# Patient Record
Sex: FEMALE | Race: WHITE | Employment: FULL TIME | URBAN - METROPOLITAN AREA
[De-identification: names, ages, dates, MRNs, and addresses within clinical notes are randomized per-mention and may not be internally consistent; named-entity substitution may affect disease eponyms.]

---

## 2022-07-26 ENCOUNTER — HOSPITAL ENCOUNTER (OUTPATIENT)
Dept: PHYSICAL THERAPY | Age: 57
Discharge: HOME | End: 2022-07-26
Payer: COMMERCIAL

## 2022-07-26 PROCEDURE — 97162 PT EVAL MOD COMPLEX 30 MIN: CPT

## 2022-07-26 PROCEDURE — 97140 MANUAL THERAPY 1/> REGIONS: CPT

## 2022-07-26 PROCEDURE — 97110 THERAPEUTIC EXERCISES: CPT

## 2022-07-26 NOTE — PROGRESS NOTES
[address] PHYS[facility] THERAPY AT [facility]  [address] [facility] [address] [address],[address], [place] levi, Gigi Winters  Phone [phone]  Fax [phone] / [address] [address] PHYSICAL THERAPY SERVICES  Patient Name: Jennie Reed : 1965   Medical   Diagnosis: Left ankle pain [M25.572] Treatment Diagnosis: Left ankle pain   Onset Date:      Referral Source: Blayne Bain MD Start of Care [facility]): 2022   Prior Hospitalization: See medical history Provider #: [id number]   Prior Level of Function: IND, enjoyed walking and riding her bike, 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   =================================================================================  Assessment / key information:    Subjective functional status/changes: The patient reports left ankle pain that believes started around  as she gradually started gaining weight. She states she her ankle pain was gradually increases and was diagnosed with arthritis and posterior tibial tendonitis based off of x-rays per patient report. The patient reports exacerbation of stiffness, pain and imbalance around early 2022. She reports occasional numbness and tingling to her left foot. Her symptoms are increased with stair ambulation, when she wakes up in the morning, prolonged walking. Her pain is was unchanged with ibuprofen, declined steroids because of previous medical hx. She would like to return to walking and riding her bike. Pain at worst: 7-8/10. FOTO: 43/100. Other Objective/Functional Measures:        Fall Risk Assessment: Does the patient have a fear of falling? YES If yes, what fall risk assessment was performed?   5xSTS: 20 seconds without UE support, pain increase to 3/10, decreased WB through LLE  SLS: right 13 seconds, left unable   Gait: decreased weight acceptance on the left and limited left heel strike      Heel raise height R>L and pain increase on L     Knee strength: 5/5 globally B/L         LEFT Ankle strength: DF 5/5, PF 5/5, EVR 4+/5, INV 4-/5 with pain; posterior tibialis 4-/5 with pain upon resistance      LEFT Ankle AROM before manual: DF -15 deg P!, EVR 5 deg p!, INV 35 deg       LEFT Ankle AROM after manual: DF +5 deg, EVR 15 deg     Palpation: TTP at distal posterior tib       Pain Level (0-10 scale) post treatment: 0/10     ASSESSMENT/Changes in Function:   Upon evaluation, the patient presents with c/o left ankle pain. She presents today with limitations in left ankle strength. Left ankle AROM limited and painful, improved following manual treatment. TTP along distal posterior tib with limited strength and pain upon resistance. She presents with decreased functional mobility and balance indicated by 5xSTS test and SLS tolerance. Developed and provided HEP.   The patient will benefit from skilled PT to address above limitations and improve QoL.  =================================================================================  Eval Complexity: History: MEDIUM  Complexity : 1-2 comorbidities / personal factors will impact the outcome/ POC Exam:MEDIUM Complexity : 3 Standardized tests and measures addressing body structure, function, activity limitation and / or participation in recreation  Presentation: MEDIUM Complexity : Evolving with changing characteristics  Clinical Decision Making:MEDIUM Complexity : FOTO score of 26-74Overall Complexity:MEDIUM  Problem List: pain affecting function, decrease ROM, decrease strength, edema affecting function, impaired gait/ balance, decrease ADL/ functional abilitiies, decrease activity tolerance, and decrease flexibility/ joint mobility   Treatment Plan may include any combination of the following: Therapeutic exercise, Therapeutic activities, Neuromuscular re-education, Physical agent/modality, Gait/balance training, Manual therapy, and Patient education  Patient / Family readiness to learn indicated by: asking questions  Persons(s) to be included in education: patient (P)  Barriers to Learning/Limitations: None  Measures taken:    Patient Goal (s): Better control of the left ankle   Patient self reported health status: good  Rehabilitation Potential: good      Short term goals to be accomplished in 4 visits: The pt will be IND and compliant with HEP and self management of symptoms. The patient will demonstrate good heel strike on the left to improve her walking tolerance. Long term goals to be accomplished in 12 visits: The patient will improve left ankle INV and EVR strength to 5/5 to improve her walking tolerance. The patient will improve left ankle DF AROM to 10 deg and EVR AROM to 20 deg to improve her stair ambulation tolerance. The patient will perform 5xSTS test in 18 seconds as an indicator of improved functional mobility. The patient will tolerance SLS on the left for 5 seconds to improve her gait stability. The patient will improve FOTO score to 64/100 as a functional indicator of improved mobility. Frequency / Duration:   Patient to be seen  2  times per week for 12  treatments: (All LTG as above will be assessed and updated every 10 visits or 30 days and progressed as needed)    Patient / Caregiver education and instruction: exercises  Therapist Signature: Shoshana Lefort, PT Date: 5/72/4905   Certification Period: N/A Time: 7:27 PM   ===========================================================================================  I certify that the above Physical Therapy Services are being furnished while the patient is under my care. I agree with the treatment plan and certify that this therapy is necessary.     Physician Signature:        Date:       Time:     Please sign and return to [facility] or you may fax the signed copy to [phone]. Thank you.   Dimitrios Neri MD

## 2022-07-26 NOTE — PROGRESS NOTES
PT DAILY TREATMENT NOTE     Patient Name: Arturo Reed  Date:2022  : 1965  [x]  Patient  Verified  Payor: BLUE CROSS / Plan: [facility] PPO / Product Type: PPO /    In time:4:18  Out time:5:03  Total Treatment Time (min): 45  Visit #: 1 of 12    Medicare/BCBS Only   Total Timed Codes (min):  25 1:1 Treatment Time:  45       Treatment Area: Left ankle pain [M25.572]    SUBJECTIVE  Pain Level (0-10 scale): 2/10  Any medication changes, allergies to medications, adverse drug reactions, diagnosis change, or new procedure performed?: [x] No    [] Yes (see summary sheet for update)  Subjective functional status/changes:   [] No changes reported  The patient reports left ankle pain that believes started around  as she gradually started gaining weight. She states she her ankle pain was gradually increases and was diagnosed with arthritis and posterior tibial tendonitis based off of x-rays per patient report. The patient reports exacerbation of stiffness, pain and imbalance around early 2022. She reports occasional numbness and tingling to her left foot. Her symptoms are increased with stair ambulation, when she wakes up in the morning, prolonged walking. Her pain is was unchanged with ibuprofen, declined steroids because of previous medical hx. She would like to return to walking and riding her bike. Pain at worst: 7-8/10. FOTO: 43/100.      OBJECTIVE    Modality rationale: decrease edema, decrease inflammation, and decrease pain to improve the patients walking tolerance    Min Type Additional Details    [] Estim:  []Unatt       []IFC  []Premod                        []Other:  []w/ice   []w/heat  Position:  Location:    [] Estim: []Att    []TENS instruct  []NMES                    []Other:  []w/US   []w/ice   []w/heat  Position:  Location:    []  Traction: [] Cervical       []Lumbar                       [] Prone          []Supine                       []Intermittent []Continuous Lbs:  [] before manual  [] after manual    []  Ultrasound: []Continuous   [] Pulsed                           []1MHz   []3MHz W/cm2:  Location:    []  Iontophoresis with dexamethasone         Location: [] Take home patch   [] In clinic    []  Ice     []  heat  []  Ice massage  []  Laser   []  Anodyne Position:  Location:    []  Laser with stim  []  Other:  Position:  Location:    []  Vasopneumatic Device  Pre-treatment girth:   Post-treatment girth:   Measured at landmark location:  Pressure:       [] lo [] med [] hi   Temperature: [] lo [] med [] hi   [] Skin assessment post-treatment:  []intact []redness- no adverse reaction    []redness - adverse reaction:   Vasopnuematic compression justification:  Per bilateral girth measures taken and listed above the edema is considered significant and having an impact on the patient's strength, balance, and gait    20 min [x]Eval                  []Re-Eval       15 min Therapeutic Exercise:  [] See flow sheet :  HEP development and review   Seated soleus stretch  Toe yoga   Rationale: increase ROM and increase strength to improve the patients walking tolerance    x min Therapeutic Activity:  []  See flow sheet :   Rationale: increase ROM and increase strength  to improve the patients ability to ambulate stairs      x min Neuromuscular Re-education:  []  See flow sheet :   Rationale: increase ROM, increase strength, improve balance, and increase proprioception  to improve the patients gait stability    10 min Manual Therapy: In supine, posterior talar mobs, left ankle PROM, calcaneal mobs and distraction   The manual therapy interventions were performed at a separate and distinct time from the therapeutic activities interventions.   Rationale: decrease pain, increase ROM, and decrease edema  to improve her walking tolerance           With   [] TE   [] TA   [] neuro   [] other: Patient Education: [x] Review HEP    [] Progressed/Changed HEP based on:   [] positioning   [] body mechanics   [] transfers   [] heat/ice application    [] other:      Other Objective/Functional Measures:    Fall Risk Assessment: Does the patient have a fear of falling? YES If yes, what fall risk assessment was performed? 5xSTS: 20 seconds without UE support, pain increase to 3/10, decreased WB through LLE   SLS: right 13 seconds, left unable   Gait: decreased weight acceptance on the left and limited left heel strike     Heel raise height R>L and pain increase on L     Knee strength: 5/5 globally B/L        LEFT Ankle strength: DF 5/5, PF 5/5, EVR 4+/5, INV 4-/5 with pain; posterior tibialis 4-/5 with pain upon resistance     LEFT Ankle AROM before manual: DF -15 deg P!, EVR 5 deg p!, INV 35 deg      LEFT Ankle AROM after manual: DF +5 deg, EVR 15 deg    Palpation: TTP at distal posterior tib      Pain Level (0-10 scale) post treatment: 0/10    ASSESSMENT/Changes in Function:   Upon evaluation, the patient presents with c/o left ankle pain. She presents today with limitations in left ankle strength. Left ankle AROM limited and painful, improved following manual treatment. TTP along distal posterior tib with limited strength and pain upon resistance. She presents with decreased functional mobility and balance indicated by 5xSTS test and SLS tolerance. Developed and provided HEP. The patient will benefit from skilled PT to address above limitations and improve QoL. Patient will continue to benefit from skilled PT services to modify and progress therapeutic interventions, address functional mobility deficits, address ROM deficits, address strength deficits, analyze and address soft tissue restrictions, analyze and cue movement patterns, and analyze and modify body mechanics/ergonomics to attain remaining goals.      [x]  See Plan of Care  []  See progress note/recertification  []  See Discharge Summary         Progress towards goals / Updated goals:  Short term goals to be accomplished in 4 visits: The pt will be IND and compliant with HEP and self management of symptoms. The patient will demonstrate good heel strike on the left to improve her walking tolerance. Long term goals to be accomplished in 12 visits: The patient will improve left ankle INV and EVR strength to 5/5 to improve her walking tolerance. The patient will improve left ankle DF AROM to 10 deg and EVR AROM to 20 deg to improve her stair ambulation tolerance. The patient will perform 5xSTS test in 18 seconds as an indicator of improved functional mobility. The patient will tolerance SLS on the left for 5 seconds to improve her gait stability. The patient will improve FOTO score to 64/100 as a functional indicator of improved mobility. PLAN  []  Upgrade activities as tolerated     []  Continue plan of care  []  Update interventions per flow sheet       []  Discharge due to:_  [x]  Other: begin skilled PT 2x/week for 12 visits       Justification for Eval Code Complexity:  Patient History : hx of ankle pain starting 2016  Examination see exam   Clinical Presentation: evolving with changing characteristics   Clinical Decision Making : FOTO : 37 /100     Del Lew, PT 7/26/2022  4:19 PM    No future appointments.

## 2022-07-28 ENCOUNTER — HOSPITAL ENCOUNTER (OUTPATIENT)
Dept: PHYSICAL THERAPY | Age: 57
Discharge: HOME | End: 2022-07-28
Payer: COMMERCIAL

## 2022-07-28 PROCEDURE — 97140 MANUAL THERAPY 1/> REGIONS: CPT

## 2022-07-28 PROCEDURE — 97110 THERAPEUTIC EXERCISES: CPT

## 2022-07-28 NOTE — PROGRESS NOTES
PT DAILY TREATMENT NOTE     Patient Name: Manju Sharp Old  Date:2022  : 1965  [x]  Patient  Verified  Payor: BLUE CROSS / Plan: [facility] PPO / Product Type: PPO /    In time:4:20 Out time: 5:05   Total Treatment Time (min): 45   Visit #: 2 of 12    Medicare/BCBS Only   Total Timed Codes (min):  45  1:1 Treatment Time:  45        Treatment Area: Left ankle pain [M25.572]    SUBJECTIVE  Pain Level (0-10 scale): 1/10  Any medication changes, allergies to medications, adverse drug reactions, diagnosis change, or new procedure performed?: [x] No    [] Yes (see summary sheet for update)  Subjective functional status/changes:   [] No changes reported  The patient states sneakers hurt her ankle so she is wearing sandals. No questions regarding HEP.      OBJECTIVE    Modality rationale: decrease edema, decrease inflammation, and decrease pain to improve the patients walking tolerance    Min Type Additional Details    [] Estim:  []Unatt       []IFC  []Premod                        []Other:  []w/ice   []w/heat  Position:  Location:    [] Estim: []Att    []TENS instruct  []NMES                    []Other:  []w/US   []w/ice   []w/heat  Position:  Location:    []  Traction: [] Cervical       []Lumbar                       [] Prone          []Supine                       []Intermittent   []Continuous Lbs:  [] before manual  [] after manual    []  Ultrasound: []Continuous   [] Pulsed                           []1MHz   []3MHz W/cm2:  Location:    []  Iontophoresis with dexamethasone         Location: [] Take home patch   [] In clinic    []  Ice     []  heat  []  Ice massage  []  Laser   []  Anodyne Position:  Location:    []  Laser with stim  []  Other:  Position:  Location:    []  Vasopneumatic Device  Pre-treatment girth:   Post-treatment girth:   Measured at landmark location:  Pressure:       [] lo [] med [] hi   Temperature: [] lo [] med [] hi   [] Skin assessment post-treatment:  []intact []redness- no adverse reaction    []redness - adverse reaction:   Vasopnuematic compression justification:  Per bilateral girth measures taken and listed above the edema is considered significant and having an impact on the patient's strength, balance, and gait    25 min Therapeutic Exercise:  [] See flow sheet :  Bike   Soleus stretch standing - pain   Marbles  Toe yoga  Short foot  Towel scrunch   Patient education: anatomy of anterior tib, posterior tib; ice application     Rationale: increase ROM and increase strength to improve the patients walking tolerance    10  min Therapeutic Activity:  []  See flow sheet :  HR/TR standing  Mini squats -- held pain    Rationale: increase ROM and increase strength  to improve the patients ability to ambulate stairs      x min Neuromuscular Re-education:  []  See flow sheet :   Rationale: increase ROM, increase strength, improve balance, and increase proprioception  to improve the patients gait stability    10  min Manual Therapy: In seated EOB, posterior talar mobs, left ankle PROM, calcaneal mobs and distraction; STM to ant tib    The manual therapy interventions were performed at a separate and distinct time from the therapeutic activities interventions. Rationale: decrease pain, increase ROM, and decrease edema  to improve her walking tolerance           With   [] TE   [] TA   [] neuro   [] other: Patient Education: [x] Review HEP    [] Progressed/Changed HEP based on:   [] positioning   [] body mechanics   [] transfers   [] heat/ice application    [] other:      Other Objective/Functional Measures:    Initiated per POC   Increased left ankle pain reported with standing soleus stretch  Increased left knee pain with mini squats     Pain Level (0-10 scale) post treatment: 1 /10    ASSESSMENT/Changes in Function:   Poor tolerance to standing exercises as the patient reported increased pain at left ankle and left knee.  Increased rest breaks during standing exercises secondary to pain. Good tolerance to seated foot/ankle exercises. Noted increased tightness along left ant tib. Progress as tolerated nv. Patient will continue to benefit from skilled PT services to modify and progress therapeutic interventions, address functional mobility deficits, address ROM deficits, address strength deficits, analyze and address soft tissue restrictions, analyze and cue movement patterns, and analyze and modify body mechanics/ergonomics to attain remaining goals. [x]  See Plan of Care  []  See progress note/recertification  []  See Discharge Summary         Progress towards goals / Updated goals:  Short term goals to be accomplished in 4 visits: The pt will be IND and compliant with HEP and self management of symptoms. The patient will demonstrate good heel strike on the left to improve her walking tolerance. Long term goals to be accomplished in 12 visits: The patient will improve left ankle INV and EVR strength to 5/5 to improve her walking tolerance. The patient will improve left ankle DF AROM to 10 deg and EVR AROM to 20 deg to improve her stair ambulation tolerance. The patient will perform 5xSTS test in 18 seconds as an indicator of improved functional mobility. The patient will tolerance SLS on the left for 5 seconds to improve her gait stability. The patient will improve FOTO score to 64/100 as a functional indicator of improved mobility.       PLAN  []  Upgrade activities as tolerated     []  Continue plan of care  []  Update interventions per flow sheet       []  Discharge due to:_  [x]  Other: progress as tolerated Choco Donovan, PT 7/28/2022  4:19 PM    Future Appointments   Date Time Provider Lupillo James   8/2/2022  4:15 PM Craigheadchau Malik, PT [facility]   8/4/2022  3:30 PM Apoloniachau Malik, PT [facility]   8/9/2022  4:15 PM Mónica Arroyo [facility]   8/11/2022  4:15 PM Craigheadchau Malik, PT [facility]   8/16/2022  2:45 PM Apoloniachau Malik, PT [facility]   8/18/2022  4:15 PM Marlana Schwartz [facility]   8/23/2022  3:30 PM Marlana Schwartz [facility]   8/25/2022  2:45 PM Humaira Piña, PT [facility]   8/30/2022  4:15 PM Humaira Piña, PT [facility]   9/1/2022  4:15 PM Humaira Piña, PT [facility]   9/6/2022  4:15 PM Humaira Piña, PT [facility]   9/8/2022  4:15 PM Humaira Piña, PT [facility]

## 2022-08-02 ENCOUNTER — HOSPITAL ENCOUNTER (OUTPATIENT)
Dept: PHYSICAL THERAPY | Age: 57
Discharge: HOME | End: 2022-08-02
Payer: COMMERCIAL

## 2022-08-02 PROCEDURE — 97140 MANUAL THERAPY 1/> REGIONS: CPT

## 2022-08-02 PROCEDURE — 97110 THERAPEUTIC EXERCISES: CPT

## 2022-08-02 PROCEDURE — 97530 THERAPEUTIC ACTIVITIES: CPT

## 2022-08-02 PROCEDURE — 97016 VASOPNEUMATIC DEVICE THERAPY: CPT

## 2022-08-02 NOTE — PROGRESS NOTES
PT DAILY TREATMENT NOTE     Patient Name: Shira Crystal  Date:2022  : 1965  [x]  Patient  Verified  Payor: BLUE CROSS / Plan: [facility] PPO / Product Type: PPO /    In time:4:20 Out time: 5:14   Total Treatment Time (min): 54    Visit #: 3 of 12    Medicare/BCBS Only   Total Timed Codes (min):  44   1:1 Treatment Time:  41        Treatment Area: Left ankle pain [M25.572]    SUBJECTIVE  Pain Level (0-10 scale): 1/10  Any medication changes, allergies to medications, adverse drug reactions, diagnosis change, or new procedure performed?: [x] No    [] Yes (see summary sheet for update)  Subjective functional status/changes:   [] No changes reported  The patient reports she is feeling good, good compliance with HEP.      OBJECTIVE    Modality rationale: decrease edema, decrease inflammation, and decrease pain to improve the patients walking tolerance    Min Type Additional Details    [] Estim:  []Unatt       []IFC  []Premod                        []Other:  []w/ice   []w/heat  Position:  Location:    [] Estim: []Att    []TENS instruct  []NMES                    []Other:  []w/US   []w/ice   []w/heat  Position:  Location:    []  Traction: [] Cervical       []Lumbar                       [] Prone          []Supine                       []Intermittent   []Continuous Lbs:  [] before manual  [] after manual    []  Ultrasound: []Continuous   [] Pulsed                           []1MHz   []3MHz W/cm2:  Location:    []  Iontophoresis with dexamethasone         Location: [] Take home patch   [] In clinic    []  Ice     []  heat  []  Ice massage  []  Laser   []  Anodyne Position:  Location:    []  Laser with stim  []  Other:  Position:  Location:   10 [x]  Vasopneumatic Device  Pre-treatment girth: 46 cm   Post-treatment girth: 45.5 cm   Measured at landmark location: figure 8 Pressure:       [] lo [x] med [] hi   Temperature: [x] lo [] med [] hi   [x] Skin assessment post-treatment:  []intact []redness- no adverse reaction    []redness - adverse reaction:   Vasopnuematic compression justification:  Per bilateral girth measures taken and listed above the edema is considered significant and having an impact on the patient's strength, balance, and gait    10  min Therapeutic Exercise:  [] See flow sheet :  Bike   Hamstring stretch at stairs  Soleus stretch standing - pain   Patient education: anatomy of anterior tib, posterior tib; ice application      TC:   Marbles  Toe yoga  Short foot  Towel scrunch    Rationale: increase ROM and increase strength to improve the patients walking tolerance    26/23   min Therapeutic Activity:  []  See flow sheet :  HR/TR standing  Mini squats   Hip 3 way for improved standing tolerance   MSR bunion    Rationale: increase ROM and increase strength  to improve the patients ability to ambulate stairs      x min Neuromuscular Re-education:  []  See flow sheet :   Rationale: increase ROM, increase strength, improve balance, and increase proprioception  to improve the patients gait stability    8   min Manual Therapy: In seated EOB, posterior talar mobs, left ankle PROM, calcaneal mobs and distraction; STM to ant tib    The manual therapy interventions were performed at a separate and distinct time from the therapeutic activities interventions. Rationale: decrease pain, increase ROM, and decrease edema  to improve her walking tolerance           With   [] TE   [] TA   [] neuro   [] other: Patient Education: [x] Review HEP    [] Progressed/Changed HEP based on:   [] positioning   [] body mechanics   [] transfers   [] heat/ice application    [] other:      Other Objective/Functional Measures:    MSR bunion: 30 seconds with minimal sway, patient reported increased muscle fatigue following the exercise      Pain Level (0-10 scale) post treatment: 0  /10    ASSESSMENT/Changes in Function:   The patient presented wearing sneakers. Improved tolerance to standing exercises today. She demonstrated MSR bunion for 30 seconds with minimal sway, however patient reported increased muscle fatigue following the exercise. Good response to vasopneumatic device indicated by decrease in swelling at the left ankle following the modality. Patient will continue to benefit from skilled PT services to modify and progress therapeutic interventions, address functional mobility deficits, address ROM deficits, address strength deficits, analyze and address soft tissue restrictions, analyze and cue movement patterns, and analyze and modify body mechanics/ergonomics to attain remaining goals. [x]  See Plan of Care  []  See progress note/recertification  []  See Discharge Summary         Progress towards goals / Updated goals:  Short term goals to be accomplished in 4 visits: The pt will be IND and compliant with HEP and self management of symptoms. Current: good compliance 08/02/2022  The patient will demonstrate good heel strike on the left to improve her walking tolerance. Long term goals to be accomplished in 12 visits: The patient will improve left ankle INV and EVR strength to 5/5 to improve her walking tolerance. The patient will improve left ankle DF AROM to 10 deg and EVR AROM to 20 deg to improve her stair ambulation tolerance. The patient will perform 5xSTS test in 18 seconds as an indicator of improved functional mobility. The patient will tolerance SLS on the left for 5 seconds to improve her gait stability. Current: MSR bunion: 30 seconds with minimal sway, patient reported increased muscle fatigue following the exercise 08/02/2022   The patient will improve FOTO score to 64/100 as a functional indicator of improved mobility.       PLAN  []  Upgrade activities as tolerated     []  Continue plan of care  []  Update interventions per flow sheet       []  Discharge due to:_  [x]  Other: progress as tolerated navin Helms, PT 8/2/2022  4:19 PM    Future Appointments   Date Time Provider Lupillo James   8/4/2022  3:30 PM Sarah Tesfaye, PT [facility]   8/8/2022 11:30 AM Sarah Tesfaye, PT [facility]   8/11/2022  4:15 PM Sarah Tesfaye, PT [facility]   8/16/2022  2:45 PM Sarah Tesfaye, PT [facility]   8/18/2022  4:15 PM Jade Critchley [facility]   8/23/2022  3:30 PM Jade Critchley [facility]   8/25/2022  2:45 PM Sarah Tesfaye, PT [facility]   9/6/2022  4:15 PM Sarah Tesfaye, PT [facility]   9/8/2022  4:15 PM Sarah Tesfaye, PT [facility]

## 2022-08-04 ENCOUNTER — HOSPITAL ENCOUNTER (OUTPATIENT)
Dept: PHYSICAL THERAPY | Age: 57
Discharge: HOME | End: 2022-08-04
Payer: COMMERCIAL

## 2022-08-04 PROCEDURE — 97530 THERAPEUTIC ACTIVITIES: CPT

## 2022-08-04 PROCEDURE — 97140 MANUAL THERAPY 1/> REGIONS: CPT

## 2022-08-04 PROCEDURE — 97016 VASOPNEUMATIC DEVICE THERAPY: CPT

## 2022-08-04 PROCEDURE — 97110 THERAPEUTIC EXERCISES: CPT

## 2022-08-04 NOTE — PROGRESS NOTES
PT DAILY TREATMENT NOTE     Patient Name: Ashanti Romano  Date:2022  : 1965  [x]  Patient  Verified  Payor: BLUE CROSS / Plan: [facility] PPO / Product Type: PPO /    In time: 3:30 Out time: 4:29    Total Treatment Time (min): 59     Visit #: 4 of 12    Medicare/BCBS Only   Total Timed Codes (min):  49    1:1 Treatment Time:  49         Treatment Area: Left ankle pain [M25.572]    SUBJECTIVE  Pain Level (0-10 scale): 1/10  Any medication changes, allergies to medications, adverse drug reactions, diagnosis change, or new procedure performed?: [x] No    [] Yes (see summary sheet for update)  Subjective functional status/changes:   [] No changes reported  The patient reports she was feeling much better after last visit.      OBJECTIVE    Modality rationale: decrease edema, decrease inflammation, and decrease pain to improve the patients walking tolerance    Min Type Additional Details    [] Estim:  []Unatt       []IFC  []Premod                        []Other:  []w/ice   []w/heat  Position:  Location:    [] Estim: []Att    []TENS instruct  []NMES                    []Other:  []w/US   []w/ice   []w/heat  Position:  Location:    []  Traction: [] Cervical       []Lumbar                       [] Prone          []Supine                       []Intermittent   []Continuous Lbs:  [] before manual  [] after manual    []  Ultrasound: []Continuous   [] Pulsed                           []1MHz   []3MHz W/cm2:  Location:    []  Iontophoresis with dexamethasone         Location: [] Take home patch   [] In clinic    []  Ice     []  heat  []  Ice massage  []  Laser   []  Anodyne Position:  Location:    []  Laser with stim  []  Other:  Position:  Location:   10 [x]  Vasopneumatic Device  Pre-treatment girth: 47 cm   Post-treatment girth: 46 cm   Measured at landmark location: figure 8 Pressure:       [] lo [x] med [] hi   Temperature: [x] lo [] med [] hi   [x] Skin assessment post-treatment:  []intact []redness- no adverse reaction    []redness - adverse reaction:   Vasopnuematic compression justification:  Per bilateral girth measures taken and listed above the edema is considered significant and having an impact on the patient's strength, balance, and gait    19   min Therapeutic Exercise:  [] See flow sheet :  Bike   Hamstring stretch at stairs  Soleus stretch at incline board   Patient education: anatomy of anterior tib, posterior tib; ice application      TC:   Marbles  Toe yoga  Short foot  Towel scrunch    Rationale: increase ROM and increase strength to improve the patients walking tolerance    18    min Therapeutic Activity:  []  See flow sheet :  HR/TR standing  Mini squats -TC  Hip 3 way for improved standing tolerance   MSR bunion   Wall sits    Rationale: increase ROM and increase strength  to improve the patients ability to ambulate stairs      x min Neuromuscular Re-education:  []  See flow sheet :   Rationale: increase ROM, increase strength, improve balance, and increase proprioception  to improve the patients gait stability    12    min Manual Therapy:  reclined, STM to posterior tib; The manual therapy interventions were performed at a separate and distinct time from the therapeutic activities interventions. Rationale: decrease pain, increase ROM, and decrease edema  to improve her walking tolerance           With   [] TE   [] TA   [] neuro   [] other: Patient Education: [x] Review HEP    [] Progressed/Changed HEP based on:   [] positioning   [] body mechanics   [] transfers   [] heat/ice application    [] other:      Other Objective/Functional Measures:    Intermittent heel strike on the left        Pain Level (0-10 scale) post treatment: 0 /10    ASSESSMENT/Changes in Function:   Improved tolerance to standing exercises today. Secondary to reports of pain with mini squats, performed wall sits. Introduced ankle 4 way, tactile cues for INV, EVR to limit hip rotation compensation.  Left posterior tib TTP during manual treatment. Progress as tolerated nv and updated HEP. Patient will continue to benefit from skilled PT services to modify and progress therapeutic interventions, address functional mobility deficits, address ROM deficits, address strength deficits, analyze and address soft tissue restrictions, analyze and cue movement patterns, and analyze and modify body mechanics/ergonomics to attain remaining goals. [x]  See Plan of Care  []  See progress note/recertification  []  See Discharge Summary         Progress towards goals / Updated goals:  Short term goals to be accomplished in 4 visits: The pt will be IND and compliant with HEP and self management of symptoms. Current: good compliance 08/02/2022  The patient will demonstrate good heel strike on the left to improve her walking tolerance. Current: intermittent 08/04/2022    Long term goals to be accomplished in 12 visits: The patient will improve left ankle INV and EVR strength to 5/5 to improve her walking tolerance. The patient will improve left ankle DF AROM to 10 deg and EVR AROM to 20 deg to improve her stair ambulation tolerance. The patient will perform 5xSTS test in 18 seconds as an indicator of improved functional mobility. The patient will tolerance SLS on the left for 5 seconds to improve her gait stability. Current: MSR bunion: 30 seconds with minimal sway, patient reported increased muscle fatigue following the exercise 08/02/2022   The patient will improve FOTO score to 64/100 as a functional indicator of improved mobility.       PLAN  []  Upgrade activities as tolerated     []  Continue plan of care  []  Update interventions per flow sheet       []  Discharge due to:_  [x]  Other: progress as tolerated Gisselle Solomon PT 8/4/2022  4:19 PM    Future Appointments   Date Time Provider Lupillo James   8/8/2022 11:30 AM Scott Constantino PT [facility]   8/11/2022  4:15 PM Scott Constantino PT [facility]   8/16/2022  2:45 PM Sarah Tesfaye, PT [facility]   8/18/2022  4:15 PM Jade Critchley [facility]   8/23/2022  3:30 PM [facility]   8/25/2022  2:45 PM Sarah Tesfaye, PT [facility]   9/6/2022  4:15 PM Sarah Tesfaye, PT [facility]   9/8/2022  4:15 PM Sarah Tesfaye, PT [facility]

## 2022-08-08 ENCOUNTER — HOSPITAL ENCOUNTER (OUTPATIENT)
Dept: PHYSICAL THERAPY | Age: 57
Discharge: HOME | End: 2022-08-08
Payer: COMMERCIAL

## 2022-08-08 PROCEDURE — 97016 VASOPNEUMATIC DEVICE THERAPY: CPT

## 2022-08-08 PROCEDURE — 97110 THERAPEUTIC EXERCISES: CPT

## 2022-08-08 PROCEDURE — 97140 MANUAL THERAPY 1/> REGIONS: CPT

## 2022-08-08 NOTE — PROGRESS NOTES
PT DAILY TREATMENT NOTE     Patient Name: Jayce Krishnan  Date:2022  : 1965  [x]  Patient  Verified  Payor: BLUE CROSS / Plan: [facility] PPO / Product Type: PPO /    In time: 11:35 Out time: 12:35     Total Treatment Time (min): 60      Visit #: 5 of 12    Medicare/BCBS Only   Total Timed Codes (min):  50     1:1 Treatment Time:  50          Treatment Area: Left ankle pain [M25.572]    SUBJECTIVE  Pain Level (0-10 scale): 210  Any medication changes, allergies to medications, adverse drug reactions, diagnosis change, or new procedure performed?: [x] No    [] Yes (see summary sheet for update)  Subjective functional status/changes:   [] No changes reported  The patient reports she stepped down hard off of a curb on Saturday. She reports increased pain with walking across the parking lot.      OBJECTIVE    Modality rationale: decrease edema, decrease inflammation, and decrease pain to improve the patients walking tolerance    Min Type Additional Details    [] Estim:  []Unatt       []IFC  []Premod                        []Other:  []w/ice   []w/heat  Position:  Location:    [] Estim: []Att    []TENS instruct  []NMES                    []Other:  []w/US   []w/ice   []w/heat  Position:  Location:    []  Traction: [] Cervical       []Lumbar                       [] Prone          []Supine                       []Intermittent   []Continuous Lbs:  [] before manual  [] after manual    []  Ultrasound: []Continuous   [] Pulsed                           []1MHz   []3MHz W/cm2:  Location:    []  Iontophoresis with dexamethasone         Location: [] Take home patch   [] In clinic    []  Ice     []  heat  []  Ice massage  []  Laser   []  Anodyne Position:  Location:    []  Laser with stim  []  Other:  Position:  Location:   10 [x]  Vasopneumatic Device  Pre-treatment girth: 47 cm   Post-treatment girth: 46 cm   Measured at landmark location: figure 8 Pressure:       [] lo [x] med [] hi Temperature: [x] lo [] med [] hi   [x] Skin assessment post-treatment:  []intact []redness- no adverse reaction    []redness - adverse reaction:   Vasopnuematic compression justification:  Per bilateral girth measures taken and listed above the edema is considered significant and having an impact on the patient's strength, balance, and gait    32    min Therapeutic Exercise:  [] See flow sheet :  Bike   Hamstring stretch at stairs  Soleus stretch at incline board     Towel scrunch   Marbles SEATED   Posterior tib iso with ball SEATED     TC:   Toe yoga  Short foot   Rationale: increase ROM and increase strength to improve the patients walking tolerance    3     min Therapeutic Activity:  []  See flow sheet :  HR/TR standing  Mini squats -TC  Hip 3 way for improved standing tolerance   MSR bunion   Wall sits    Rationale: increase ROM and increase strength  to improve the patients ability to ambulate stairs      x min Neuromuscular Re-education:  []  See flow sheet :   Rationale: increase ROM, increase strength, improve balance, and increase proprioception  to improve the patients gait stability    15   min Manual Therapy:  reclined, STM to posterior tib and plantar fascia    The manual therapy interventions were performed at a separate and distinct time from the therapeutic activities interventions. Rationale: decrease pain, increase ROM, and decrease edema  to improve her walking tolerance           With   [] TE   [] TA   [] neuro   [] other: Patient Education: [x] Review HEP    [] Progressed/Changed HEP based on:   [] positioning   [] body mechanics   [] transfers   [] heat/ice application    [] other:      Other Objective/Functional Measures:    Increased pain with standing exercises today   Introduced posterior tib iso with ball     Pain Level (0-10 scale) post treatment: 0 /10    ASSESSMENT/Changes in Function:   Increased pain with standing exercises today, focused on seated and supine exercises.  Updated HEP. Improved tolerance to resisted ankle inversion and eversion. Noted increased tightness along plantar fascia. Progress as tolerated nv. Patient will continue to benefit from skilled PT services to modify and progress therapeutic interventions, address functional mobility deficits, address ROM deficits, address strength deficits, analyze and address soft tissue restrictions, analyze and cue movement patterns, and analyze and modify body mechanics/ergonomics to attain remaining goals. [x]  See Plan of Care  []  See progress note/recertification  []  See Discharge Summary         Progress towards goals / Updated goals:  Short term goals to be accomplished in 4 visits: The pt will be IND and compliant with HEP and self management of symptoms. Current: good compliance 08/02/2022, updated today 08/08/2022  The patient will demonstrate good heel strike on the left to improve her walking tolerance. Current: intermittent 08/04/2022    Long term goals to be accomplished in 12 visits: The patient will improve left ankle INV and EVR strength to 5/5 to improve her walking tolerance. The patient will improve left ankle DF AROM to 10 deg and EVR AROM to 20 deg to improve her stair ambulation tolerance. The patient will perform 5xSTS test in 18 seconds as an indicator of improved functional mobility. The patient will tolerance SLS on the left for 5 seconds to improve her gait stability. Current: MSR bunion: 30 seconds with minimal sway, patient reported increased muscle fatigue following the exercise 08/02/2022   The patient will improve FOTO score to 64/100 as a functional indicator of improved mobility.       PLAN  []  Upgrade activities as tolerated     []  Continue plan of care  []  Update interventions per flow sheet       []  Discharge due to:_  [x]  Other: progress as tolerated Sherrill Chandler, PT 8/8/2022  4:19 PM    Future Appointments   Date Time Provider Lupillo James   8/11/2022 4:15 PM Teodoro Jasmine, PT [facility]   8/16/2022  2:45 PM Teodoro Jasmine, PT [facility]   8/18/2022  4:15 PM Anibalus Bitter [facility]   8/23/2022  3:30 PM Anibalus Bitter [facility]   8/25/2022  2:45 PM Teodoro Jasmine, PT [facility]   9/6/2022  4:15 PM Teodoro Jasmine, PT [facility]   9/8/2022  4:15 PM Teodoro Jasmine, PT [facility]

## 2022-08-09 ENCOUNTER — APPOINTMENT (OUTPATIENT)
Dept: PHYSICAL THERAPY | Age: 57
End: 2022-08-09
Payer: COMMERCIAL

## 2022-08-11 ENCOUNTER — HOSPITAL ENCOUNTER (OUTPATIENT)
Dept: PHYSICAL THERAPY | Age: 57
Discharge: HOME | End: 2022-08-11
Payer: COMMERCIAL

## 2022-08-11 PROCEDURE — 97110 THERAPEUTIC EXERCISES: CPT

## 2022-08-11 PROCEDURE — 97140 MANUAL THERAPY 1/> REGIONS: CPT

## 2022-08-11 PROCEDURE — 97016 VASOPNEUMATIC DEVICE THERAPY: CPT

## 2022-08-11 PROCEDURE — 97530 THERAPEUTIC ACTIVITIES: CPT

## 2022-08-11 NOTE — PROGRESS NOTES
PT DAILY TREATMENT NOTE     Patient Name: Fili Madrid Old  Date:2022  : 1965  [x]  Patient  Verified  Payor: BLUE CROSS / Plan: [facility] PPO / Product Type: PPO /    In time: 4:15 Out time: 5:16      Total Treatment Time (min): 61       Visit #: 6 of 12    Medicare/BCBS Only   Total Timed Codes (min):  51      1:1 Treatment Time:  45       Treatment Area: Left ankle pain [M25.572]    SUBJECTIVE  Pain Level (0-10 scale): 2/10  Any medication changes, allergies to medications, adverse drug reactions, diagnosis change, or new procedure performed?: [x] No    [] Yes (see summary sheet for update)  Subjective functional status/changes:   [] No changes reported  The patient reports stairs continue to be painful.     OBJECTIVE    Modality rationale: decrease edema, decrease inflammation, and decrease pain to improve the patients walking tolerance    Min Type Additional Details    [] Estim:  []Unatt       []IFC  []Premod                        []Other:  []w/ice   []w/heat  Position:  Location:    [] Estim: []Att    []TENS instruct  []NMES                    []Other:  []w/US   []w/ice   []w/heat  Position:  Location:    []  Traction: [] Cervical       []Lumbar                       [] Prone          []Supine                       []Intermittent   []Continuous Lbs:  [] before manual  [] after manual    []  Ultrasound: []Continuous   [] Pulsed                           []1MHz   []3MHz W/cm2:  Location:    []  Iontophoresis with dexamethasone         Location: [] Take home patch   [] In clinic    []  Ice     []  heat  []  Ice massage  []  Laser   []  Anodyne Position:  Location:    []  Laser with stim  []  Other:  Position:  Location:   10  [x]  Vasopneumatic Device  Pre-treatment girth: 45 cm   Post-treatment girth: 45 cm   Measured at landmark location: figure 8 Pressure:       [] lo [x] med [] hi   Temperature: [x] lo [] med [] hi   [x] Skin assessment post-treatment:  []intact []redness- no adverse reaction    []redness - adverse reaction:   Vasopnuematic compression justification:  Per bilateral girth measures taken and listed above the edema is considered significant and having an impact on the patient's strength, balance, and gait    13     min Therapeutic Exercise:  [] See flow sheet :  Bike   Hamstring stretch at stairs  Soleus stretch at incline board     Ankle 4 way     TC:   Towel scrunch   Marbles SEATED -TC  Posterior tib iso with ball SEATED -TC  Toe yoga  Short foot   Rationale: increase ROM and increase strength to improve the patients walking tolerance    25     min Therapeutic Activity:  []  See flow sheet :  HR/TR standing  Mini squats   Hip 3 way for improved standing tolerance   TANDEM    Step ups fwd, lat  Wall sits -TC   Rationale: increase ROM and increase strength  to improve the patients ability to ambulate stairs      x min Neuromuscular Re-education:  []  See flow sheet :   Rationale: increase ROM, increase strength, improve balance, and increase proprioception  to improve the patients gait stability      13  min Manual Therapy:  reclined, posterior talar mobs, calcaneal mobs, metatarsal mobs, STM to plantar fascia    The manual therapy interventions were performed at a separate and distinct time from the therapeutic activities interventions. Rationale: decrease pain, increase ROM, and decrease edema  to improve her walking tolerance           With   [] TE   [] TA   [] neuro   [] other: Patient Education: [x] Review HEP    [] Progressed/Changed HEP based on:   [] positioning   [] body mechanics   [] transfers   [] heat/ice application    [] other:      Other Objective/Functional Measures:     Added resistance to hip 3 way   Resumed mini squats with improved tolerance  Introduced step ups fwd, lat  DF AROM before manual: 0 deg, after manual 2 deg     Pain Level (0-10 scale) post treatment: 0 /10    ASSESSMENT/Changes in Function:   Resumed mini squats with improved tolerance compared to previous visits. Introduced fwd and lateral step ups to 4 inch step, no pain increase reported. Noted improved left DF AROM today. Will progress as tolerated. Patient will continue to benefit from skilled PT services to modify and progress therapeutic interventions, address functional mobility deficits, address ROM deficits, address strength deficits, analyze and address soft tissue restrictions, analyze and cue movement patterns, and analyze and modify body mechanics/ergonomics to attain remaining goals. [x]  See Plan of Care  []  See progress note/recertification  []  See Discharge Summary         Progress towards goals / Updated goals:  Short term goals to be accomplished in 4 visits: The pt will be IND and compliant with HEP and self management of symptoms. Current: good compliance 08/02/2022, updated today 08/08/2022  The patient will demonstrate good heel strike on the left to improve her walking tolerance. Current: intermittent 08/04/2022    Long term goals to be accomplished in 12 visits: The patient will improve left ankle INV and EVR strength to 5/5 to improve her walking tolerance. The patient will improve left ankle DF AROM to 10 deg and EVR AROM to 20 deg to improve her stair ambulation tolerance. Current: PROGRESSING 0 deg before manual, 2 deg after manual 08/11/2022  The patient will perform 5xSTS test in 18 seconds as an indicator of improved functional mobility. The patient will tolerance SLS on the left for 5 seconds to improve her gait stability. Current: MSR bunion: 30 seconds with minimal sway, patient reported increased muscle fatigue following the exercise 08/02/2022   The patient will improve FOTO score to 64/100 as a functional indicator of improved mobility.       PLAN  []  Upgrade activities as tolerated     []  Continue plan of care  []  Update interventions per flow sheet       []  Discharge due to:_  [x]  Other: progress as tolerated nv     Kathi Tanner Leblanc, PT 8/11/2022  4:19 PM    Future Appointments   Date Time Provider Lupillo James   8/16/2022  2:45 PM Onel Le [place] [facility]   8/18/2022  4:15 PM Tammie Pauls [facility]   8/23/2022  3:30 PM Onel Le, [facility]   8/25/2022  2:45 PM Aleja Howard, PT [facility]   9/6/2022  4:15 PM Aleja Howard, PT [facility]   9/8/2022  4:15 PM Aleja Howard, PT [facility]

## 2022-08-16 ENCOUNTER — HOSPITAL ENCOUNTER (OUTPATIENT)
Dept: PHYSICAL THERAPY | Age: 57
Discharge: HOME | End: 2022-08-16
Payer: COMMERCIAL

## 2022-08-16 PROCEDURE — 97530 THERAPEUTIC ACTIVITIES: CPT

## 2022-08-16 PROCEDURE — 97140 MANUAL THERAPY 1/> REGIONS: CPT

## 2022-08-16 PROCEDURE — 97110 THERAPEUTIC EXERCISES: CPT

## 2022-08-16 PROCEDURE — 97016 VASOPNEUMATIC DEVICE THERAPY: CPT

## 2022-08-16 NOTE — PROGRESS NOTES
PT DAILY TREATMENT NOTE     Patient Name: Brooke Romero Old  Date:2022  : 1965  [x]  Patient  Verified  Payor: BLUE CROSS / Plan: [facility] PPO / Product Type: PPO /    In time:2:45 PM  Out time:3:45  Total Treatment Time (min): 60 mins  Visit #: 7 of 12    Medicare/BCBS Only   Total Timed Codes (min):  50  1:1 Treatment Time:  45       Treatment Area: Left ankle pain [M25.572]    SUBJECTIVE  Pain Level (0-10 scale): 1/10  Any medication changes, allergies to medications, adverse drug reactions, diagnosis change, or new procedure performed?: [x] No    [] Yes (see summary sheet for update)  Subjective functional status/changes:   [] No changes reported  Patient notes her pain level has been minimal the past few days, notes she's been able to take it easy and stay off her feet. OBJECTIVE    Modality rationale: decrease pain to improve the patients ability to perform ADL's with greater ease.     Min Type Additional Details    [] Estim:  []Unatt       []IFC  []Premod                        []Other:  []w/ice   []w/heat  Position:  Location:    [] Estim: []Att    []TENS instruct  []NMES                    []Other:  []w/US   []w/ice   []w/heat  Position:  Location:    []  Traction: [] Cervical       []Lumbar                       [] Prone          []Supine                       []Intermittent   []Continuous Lbs:  [] before manual  [] after manual    []  Ultrasound: []Continuous   [] Pulsed                           []1MHz   []3MHz W/cm2:  Location:    []  Iontophoresis with dexamethasone         Location: [] Take home patch   [] In clinic    []  Ice     []  heat  []  Ice massage  []  Laser   []  Anodyne Position:  Location:    []  Laser with stim  []  Other:  Position:  Location:   10' [x]  Vasopneumatic Device  Pre-treatment girth:   Post-treatment girth:   Measured at landmark location:  Pressure:       [x] lo [] med [] hi   Temperature: [x] lo [] med [] Hi  Longsitting  Left ankle  10'   [x] Skin assessment post-treatment:  [x]intact []redness- no adverse reaction    []redness - adverse reaction:   Vasopnuematic compression justification:  Per bilateral girth measures taken and listed above the edema is considered significant and having an impact on the patient's strength and ADLs      20 min Therapeutic Exercise:  [] See flow sheet :  Bike  Hamstring stretch at stairs  Soleus stretch at incline board     Ankle 4 way   Toe yoga  Short foot   Rationale: increase ROM and increase strength to improve the patients standing tolerance with decreased pain levels. 20 min Therapeutic Activity:  []  See flow sheet :  HR/TR standing  Mini squats  Hip 3 way for improved standing tolerance   TANDEM    Step ups fwd, lat  Side stepping   Rationale: increase ROM, increase strength, and improve balance  to improve the patients functional mobility for ease with ADL's. 10 min Manual Therapy:  calcaneal mobs, metatarsal mobs, STM to plantar fascia    The manual therapy interventions were performed at a separate and distinct time from the therapeutic activities interventions. Rationale: decrease pain and increase ROM to increase functional mobility in the ankle for ease with ADL's such as stair negotiation. With   [] TE   [] TA   [] neuro   [] other: Patient Education: [x] Review HEP    [] Progressed/Changed HEP based on:   [] positioning   [x] body mechanics   [] transfers   [] heat/ice application    [] other:      Other Objective/Functional Measures:   Decreased ROM on the left with step ups   Verbal cuing for holds during ankle 4-way  Slight increase in pain during soleus incline stretch   5x STS test: 25 seconds  Introduced resisted side stepping    Pain Level (0-10 scale) post treatment: 1/10    ASSESSMENT/Changes in Function:   Patient tolerated increased repetitions and the addition of side stepping.  Verbal cues gives to avoid ER during side stepping and abduction during hip 3-way. Patient notes no pain during step ups but reports she does have pain at home when performing a step through stair negotiation pattern. Continue to progress patient as tolerated. Patient will continue to benefit from skilled PT services to modify and progress therapeutic interventions, address functional mobility deficits, address ROM deficits, and address strength deficits to attain remaining goals. [x]  See Plan of Care  []  See progress note/recertification  []  See Discharge Summary         Progress towards goals / Updated goals: The patient will improve left ankle INV and EVR strength to 5/5 to improve her walking tolerance. The patient will improve left ankle DF AROM to 10 deg and EVR AROM to 20 deg to improve her stair ambulation tolerance. Current: PROGRESSING 0 deg before manual, 2 deg after manual 08/11/2022  The patient will perform 5xSTS test in 18 seconds as an indicator of improved functional mobility. Current: 25 seconds (8/16/22)  The patient will tolerance SLS on the left for 5 seconds to improve her gait stability. Current: MSR bunion: 30 seconds with minimal sway, patient reported increased muscle fatigue following the exercise 08/02/2022  The patient will improve FOTO score to 64/100 as a functional indicator of improved mobility.     PLAN  []  Upgrade activities as tolerated     [x]  Continue plan of care  []  Update interventions per flow sheet       []  Discharge due to:_  []  Other:_      Ruchi Calixto, LISA 8/16/2022  2:55 PM    Future Appointments   Date Time Provider Lupillo James   8/18/2022  4:15 PM Alfreda Pauls [facility]   8/23/2022  3:30 PM Onel Le, [place] [address] [facility]   8/25/2022  2:45 PM Aleja Howard PT [facility]   9/6/2022  4:15 PM Aleja Howard PT [facility]   9/8/2022  4:15 PM Aleja Howard PT [facility]

## 2022-08-18 ENCOUNTER — HOSPITAL ENCOUNTER (OUTPATIENT)
Dept: PHYSICAL THERAPY | Age: 57
Discharge: HOME | End: 2022-08-18
Payer: COMMERCIAL

## 2022-08-18 PROCEDURE — 97016 VASOPNEUMATIC DEVICE THERAPY: CPT

## 2022-08-18 PROCEDURE — 97110 THERAPEUTIC EXERCISES: CPT

## 2022-08-18 PROCEDURE — 97530 THERAPEUTIC ACTIVITIES: CPT

## 2022-08-18 PROCEDURE — 97140 MANUAL THERAPY 1/> REGIONS: CPT

## 2022-08-18 NOTE — PROGRESS NOTES
PT DAILY TREATMENT NOTE     Patient Name: Abdias Engle Old  Date:2022  : 1965  [x]  Patient  Verified  Payor: BLUE CROSS / Plan: [id number] PPO / Product Type: PPO /    In time:4:22 PM  Out time:5:26  Total Treatment Time (min): 64  Visit #: 8 of 12    Medicare/BCBS Only   Total Timed Codes (min):  49 1:1 Treatment Time:  44       Treatment Area: Left ankle pain [M25.572]    SUBJECTIVE  Pain Level (0-10 scale): 1/10  Any medication changes, allergies to medications, adverse drug reactions, diagnosis change, or new procedure performed?: [x] No    [] Yes (see summary sheet for update)  Subjective functional status/changes:   [] No changes reported  It hurts so much to the inside of my ankle and I get this sharp stabbing pain to the inside of the ankle and going up the inside of my leg    OBJECTIVE    Modality rationale: decrease pain to improve the patients ability to perform ADL's with greater ease.     Min Type Additional Details    [] Estim:  []Unatt       []IFC  []Premod                        []Other:  []w/ice   []w/heat  Position:  Location:    [] Estim: []Att    []TENS instruct  []NMES                    []Other:  []w/US   []w/ice   []w/heat  Position:  Location:    []  Traction: [] Cervical       []Lumbar                       [] Prone          []Supine                       []Intermittent   []Continuous Lbs:  [] before manual  [] after manual    []  Ultrasound: []Continuous   [] Pulsed                           []1MHz   []3MHz W/cm2:  Location:    []  Iontophoresis with dexamethasone         Location: [] Take home patch   [] In clinic    []  Ice     []  heat  []  Ice massage  []  Laser   []  Anodyne Position:  Location:    []  Laser with stim  []  Other:  Position:  Location:   15' [x]  Vasopneumatic Device  Pre-treatment girth:   Post-treatment girth:   Measured at landmark location:  Pressure:       [x] lo [] med [] hi   Temperature: [x] lo [] med [] Hi  Longsitting  Left ankle  10'   [x] Skin assessment post-treatment:  [x]intact []redness- no adverse reaction    []redness - adverse reaction:   Vasopnuematic compression justification:  Per bilateral girth measures taken and listed above the edema is considered significant and having an impact on the patient's strength and ADLs      14 min Therapeutic Exercise:  [] See flow sheet :  Bike  Hamstring stretch at stairs  Soleus stretch at incline board     Ankle 4 way   Toe yoga  Short foot   Rationale: increase ROM and increase strength to improve the patients standing tolerance with decreased pain levels. 20 min Therapeutic Activity:  []  See flow sheet :  HR/TR standing  Mini squats  Hip 3 way for improved    Rationale: increase ROM, increase strength, and improve balance  to improve the patients functional mobility for ease with ADL's. 15 min Manual Therapy:  calcaneal mobs, talus, navicular and cuboid   Passive stretching of the great toe  Cross friction to the tibialis posterior tendon     The manual therapy interventions were performed at a separate and distinct time from the therapeutic activities interventions. Rationale: decrease pain and increase ROM to increase functional mobility in the ankle for ease with ADL's such as stair negotiation.               With   [] TE   [] TA   [] neuro   [] other: Patient Education: [x] Review HEP    [] Progressed/Changed HEP based on:   [] positioning   [x] body mechanics   [] transfers   [] heat/ice application    [] other:      Other Objective/Functional Measures:   Increase pain to the medial aspect of the ankle, increased with attempting to perform soleus/gastro stretch on the incline board    Pain Level (0-10 scale) post treatment: 0/10    ASSESSMENT/Changes in Function:   Patient presented with pin point pain to the posterior tibialis tendon, however after cross friction massage to tendon patient was able to perform mini squat with less pain to the medial ankle joint Patient will continue to benefit from skilled PT services to modify and progress therapeutic interventions, address functional mobility deficits, address ROM deficits, and address strength deficits to attain remaining goals. [x]  See Plan of Care  []  See progress note/recertification  []  See Discharge Summary         Progress towards goals / Updated goals: The patient will improve left ankle INV and EVR strength to 5/5 to improve her walking tolerance. The patient will improve left ankle DF AROM to 10 deg and EVR AROM to 20 deg to improve her stair ambulation tolerance. Current: PROGRESSING 0 deg before manual, 2 deg after manual 08/11/2022  The patient will perform 5xSTS test in 18 seconds as an indicator of improved functional mobility. Current: 25 seconds (8/16/22)  The patient will tolerance SLS on the left for 5 seconds to improve her gait stability. Current: MSR bunion: 30 seconds with minimal sway, patient reported increased muscle fatigue following the exercise 08/02/2022  The patient will improve FOTO score to 64/100 as a functional indicator of improved mobility.     PLAN  []  Upgrade activities as tolerated     [x]  Continue plan of care  []  Update interventions per flow sheet       []  Discharge due to:_  []  Other:_      Luis Armando Certain, PTAA 8/18/2022  2:55 PM    Future Appointments   Date Time Provider Lupillo James   8/23/2022  3:30 PM Bernard Wright [facility]   8/25/2022  2:45 PM Radha Hoskins PT [facility]   9/6/2022  4:15 PM Radha Hoskins PT [facility]   9/8/2022  4:15 PM Radha Hoskins PT [facility]

## 2022-08-23 ENCOUNTER — HOSPITAL ENCOUNTER (OUTPATIENT)
Dept: PHYSICAL THERAPY | Age: 57
Discharge: HOME | End: 2022-08-23
Payer: COMMERCIAL

## 2022-08-23 PROCEDURE — 97110 THERAPEUTIC EXERCISES: CPT

## 2022-08-23 PROCEDURE — 97530 THERAPEUTIC ACTIVITIES: CPT

## 2022-08-23 PROCEDURE — 97140 MANUAL THERAPY 1/> REGIONS: CPT

## 2022-08-23 PROCEDURE — 97016 VASOPNEUMATIC DEVICE THERAPY: CPT

## 2022-08-23 NOTE — PROGRESS NOTES
PT DAILY TREATMENT NOTE     Patient Name: Mariama Liao Old  Date:2022  : 1965  [x]  Patient  Verified  Payor: BLUE CROSS / Plan: [facility] PPO / Product Type: PPO /    In time:3:29 PM  Out time: 4:30 PM  Total Treatment Time (min): 61  Visit #: 9 of 12    Medicare/BCBS Only   Total Timed Codes (min):  46 1:1 Treatment Time:  46       Treatment Area: Left ankle pain [M25.572]    SUBJECTIVE  Pain Level (0-10 scale): 0/10  Any medication changes, allergies to medications, adverse drug reactions, diagnosis change, or new procedure performed?: [x] No    [] Yes (see summary sheet for update)  Subjective functional status/changes:   [] No changes reported  Reports her pain remains the same, notes most of her pain is on the inside of her ankle. OBJECTIVE    Modality rationale: decrease pain to improve the patients ability to perform ADL's with greater ease.     Min Type Additional Details    [] Estim:  []Unatt       []IFC  []Premod                        []Other:  []w/ice   []w/heat  Position:  Location:    [] Estim: []Att    []TENS instruct  []NMES                    []Other:  []w/US   []w/ice   []w/heat  Position:  Location:    []  Traction: [] Cervical       []Lumbar                       [] Prone          []Supine                       []Intermittent   []Continuous Lbs:  [] before manual  [] after manual    []  Ultrasound: []Continuous   [] Pulsed                           []1MHz   []3MHz W/cm2:  Location:    []  Iontophoresis with dexamethasone         Location: [] Take home patch   [] In clinic    []  Ice     []  heat  []  Ice massage  []  Laser   []  Anodyne Position:  Location:    []  Laser with stim  []  Other:  Position:  Location:   15' [x]  Vasopneumatic Device  Pre-treatment girth:   Post-treatment girth:   Measured at landmark location:  Pressure:       [x] lo [] med [x] hi   Temperature: [x] lo [] med [] Hi  Longsitting  Left ankle  10'   [x] Skin assessment post-treatment: [x]intact []redness- no adverse reaction    []redness - adverse reaction:   Vasopnuematic compression justification:  Per bilateral girth measures taken and listed above the edema is considered significant and having an impact on the patient's strength and ADLs      15 min Therapeutic Exercise:  [] See flow sheet :  Bike  Hamstring stretch at stairs   Ankle 4 way   Toe yoga  Short foot   Rationale: increase ROM and increase strength to improve the patients standing tolerance with decreased pain levels. 23 min Therapeutic Activity:  []  See flow sheet :  HR/TR standing  Mini squats  Hip 3 way for improved    Rationale: increase ROM, increase strength, and improve balance  to improve the patients functional mobility for ease with ADL's.       8 min Manual Therapy:  calcaneal mobs, talus, navicular and cuboid   Passive stretching of the great toe  Cross friction to the tibialis posterior tendon     The manual therapy interventions were performed at a separate and distinct time from the therapeutic activities interventions. Rationale: decrease pain and increase ROM to increase functional mobility in the ankle for ease with ADL's such as stair negotiation. With   [] TE   [] TA   [] neuro   [] other: Patient Education: [x] Review HEP    [] Progressed/Changed HEP based on:   [] positioning   [x] body mechanics   [] transfers   [] heat/ice application    [] other:      Other Objective/Functional Measures:   Increase pain to the medial aspect of the ankle, increased with attempting to perform soleus/gastro stretch on the incline board    Pain Level (0-10 scale) post treatment: 0/10    ASSESSMENT/Changes in Function:   Patient continues to have sharp pain in the posterior tib area. Was unable to tolerate star taps or soleus stretch on incline today due to increased pain. Patient reports she is performing her HEP but notes she does not feel any improvement.  Reports she is returning to the doctor on September 4th and wants to request an MRI. Patient will continue to benefit from skilled PT services to modify and progress therapeutic interventions, address functional mobility deficits, address ROM deficits, and address strength deficits to attain remaining goals. [x]  See Plan of Care  []  See progress note/recertification  []  See Discharge Summary         Progress towards goals / Updated goals: The patient will improve left ankle INV and EVR strength to 5/5 to improve her walking tolerance. The patient will improve left ankle DF AROM to 10 deg and EVR AROM to 20 deg to improve her stair ambulation tolerance. Current: PROGRESSING 0 deg before manual, 5 deg after manual 08/23/2022  The patient will perform 5xSTS test in 18 seconds as an indicator of improved functional mobility. Current: 25 seconds (8/16/22)  The patient will tolerance SLS on the left for 5 seconds to improve her gait stability. Current: MSR bunion: 30 seconds with minimal sway, patient reported increased muscle fatigue following the exercise 08/02/2022  The patient will improve FOTO score to 64/100 as a functional indicator of improved mobility.     PLAN  [x]  Upgrade activities as tolerated     [x]  Continue plan of care  []  Update interventions per flow sheet       []  Discharge due to:_  []  Other:_      Lory Mojica 8/23/2022  2:55 PM    Future Appointments   Date Time Provider Lupillo James   8/25/2022  2:45 PM Alexandra Romero PT [facility]   9/6/2022  4:15 PM Alexandra Romero PT [facility]   9/8/2022  4:15 PM Alexandra Romero PT [facility]

## 2022-08-25 ENCOUNTER — HOSPITAL ENCOUNTER (OUTPATIENT)
Dept: PHYSICAL THERAPY | Age: 57
Discharge: HOME | End: 2022-08-25
Payer: COMMERCIAL

## 2022-08-25 PROCEDURE — 97016 VASOPNEUMATIC DEVICE THERAPY: CPT

## 2022-08-25 PROCEDURE — 97140 MANUAL THERAPY 1/> REGIONS: CPT

## 2022-08-25 PROCEDURE — 97530 THERAPEUTIC ACTIVITIES: CPT

## 2022-08-25 PROCEDURE — 97110 THERAPEUTIC EXERCISES: CPT

## 2022-08-25 NOTE — PROGRESS NOTES
PT DAILY TREATMENT NOTE     Patient Name: Manisha Reed  Date:2022  : 1965  [x]  Patient  Verified  Payor: BLUE CROSS / Plan: [facility] PPO / Product Type: PPO /    In time: 2:48  Out time: 4:00   Total Treatment Time (min):  72  Visit #: 10 of 12    Medicare/BCBS Only   Total Timed Codes (min):  62  1:1 Treatment Time:  62        Treatment Area: Left ankle pain [M25.572]    SUBJECTIVE  Pain Level (0-10 scale): 0/10  Any medication changes, allergies to medications, adverse drug reactions, diagnosis change, or new procedure performed?: [x] No    [] Yes (see summary sheet for update)  Subjective functional status/changes:   [] No changes reported    % improved: 30%  Functional gains: no numbness in left foot, average pain level: 2-3/10  Functional limitations: walking on uneven surfaces, 4/10 at worst after work, limiting activities, decreased walking speed, stairs    FOTO: 43/100   Updated patient goal: \"stairs and walking. \"    OBJECTIVE    Modality rationale: decrease pain to improve the patients ability to perform ADL's with greater ease.     Min Type Additional Details    [] Estim:  []Unatt       []IFC  []Premod                        []Other:  []w/ice   []w/heat  Position:  Location:    [] Estim: []Att    []TENS instruct  []NMES                    []Other:  []w/US   []w/ice   []w/heat  Position:  Location:    []  Traction: [] Cervical       []Lumbar                       [] Prone          []Supine                       []Intermittent   []Continuous Lbs:  [] before manual  [] after manual    []  Ultrasound: []Continuous   [] Pulsed                           []1MHz   []3MHz W/cm2:  Location:    []  Iontophoresis with dexamethasone         Location: [] Take home patch   [] In clinic    []  Ice     []  heat  []  Ice massage  []  Laser   []  Anodyne Position:  Location:    []  Laser with stim  []  Other:  Position:  Location:   10 [x]  Vasopneumatic Device  Pre-treatment girth: 45 cm  Post-treatment girth: 45 cm   Measured at landmark location: figure 8 Pressure:       [] lo [x] med [] hi   Temperature: [x] lo [] med [] Hi    Position: Reclined   Location: left ankle    [x] Skin assessment post-treatment:  [x]intact []redness- no adverse reaction    []redness - adverse reaction:   Vasopnuematic compression justification:  Per bilateral girth measures taken and listed above the edema is considered significant and having an impact on the patient's strength and ADLs      37  min Therapeutic Exercise:  [] See flow sheet :  REASSESSMENT, FOTO, HEP DEVELOPMENT AND REVIEW  PATIENT EDUCATION: PATHOLOGY, ICE APPLICATION  Bike  DF stretch at stairs   Half kneeling DF stretch at wall    Rationale: increase ROM and increase strength to improve the patients standing tolerance with decreased pain levels. 13 min Therapeutic Activity:  []  See flow sheet :  HR with left shift   Mini squats-TC  Star taps for SLS tolerance  March with hold    Rationale: increase ROM, increase strength, and improve balance  to improve the patients functional mobility for ease with ADL's.       12 min Manual Therapy:  calcaneal mobs, posterior talar mobs, distraction     STM/IASTM to left posterior tib tendon   The manual therapy interventions were performed at a separate and distinct time from the therapeutic activities interventions. Rationale: decrease pain and increase ROM to increase functional mobility in the ankle for ease with ADL's such as stair negotiation.               With   [] TE   [] TA   [] neuro   [] other: Patient Education: [x] Review HEP    [] Progressed/Changed HEP based on:   [] positioning   [x] body mechanics   [] transfers   [] heat/ice application    [] other:      Other Objective/Functional Measures:   SLS right: 21 seconds, left 3 seconds   5xSTS: 20 SECONDS with pain to 1/10  Left Ankle strength: EVR 5/5, INV 4+/5 p!, DF 5/5 PF 5/5  Left Posterior tib strength: 4+/5 without pain   DF AROM: 3 deg, EVR AROM: 15 deg     Pain Level (0-10 scale) post treatment: 0/10    ASSESSMENT/Changes in Function:   The patient presents for reassessment following 10 visits to address left ankle pain. She demonstrates improved left ankle and posterior tib strength since IE. No change in FOTO score or 5xSTS test since IE, however she demonstrated improved form and reported decreased pain with sit to stands. Continued limitation in SLS tolerance. Updated and reviewed HEP. The patient will benefit from continued skilled PT to address above limitations and improve QoL. Patient will continue to benefit from skilled PT services to modify and progress therapeutic interventions, address functional mobility deficits, address ROM deficits, and address strength deficits to attain remaining goals. [x]  See Plan of Care  []  See progress note/recertification  []  See Discharge Summary         Progress towards goals / Updated goals: The patient will improve left ankle INV and EVR strength to 5/5 to improve her walking tolerance. Current: Left Ankle strength: EVR 5/5, INV 4+/5 p!, DF 5/5 PF 5/5 and Left Posterior tib strength: 4+/5 without pain 08/25/2022  The patient will improve left ankle DF AROM to 10 deg and EVR AROM to 20 deg to improve her stair ambulation tolerance. Current: PROGRESSING 0 deg before manual, 5 deg after manual 08/23/2022, DF 3 deg, EVR 15 deg 08/25/2022  The patient will perform 5xSTS test in 18 seconds as an indicator of improved functional mobility. Current: 25 seconds (8/16/22), progressing 20 seconds with pain increase to 1/10 08/25/2022  The patient will tolerance SLS on the left for 5 seconds to improve her gait stability. Current: MSR bunion: 30 seconds with minimal sway, patient reported increased muscle fatigue following the exercise 08/02/2022, progressing 3 seconds 08/25/2022  The patient will improve FOTO score to 64/100 as a functional indicator of improved mobility.   Current: no change 43/100 08/25/2022    PLAN  [x]  Upgrade activities as tolerated     [x]  Continue plan of care  []  Update interventions per flow sheet       []  Discharge due to:_  [x]  Other: the patient will benefit from skilled PT 2x/week for 12 visits     Rojas Ngueyn, PT 8/25/2022  2:55 PM    Future Appointments   Date Time Provider Lupillo James   9/6/2022  4:15 PM Adalid Steward [place] [facility]   9/8/2022  4:15 PM Zenobia Santos, PT [facility]

## 2022-08-25 NOTE — PROGRESS NOTES
[id number] AT [facility]  [address] [place] [address],[address], [place], Gigi Winters  Phone [phone]  Fax [phone]  PROGRESS NOTE  Patient Name: Jesusita Reed : 1965   Treatment/Medical Diagnosis: Left ankle pain [M25.572]   Referral Source: Flash Mccormack MD     Date of Initial Visit: 2022 Attended Visits: 10 Missed Visits: 0     SUMMARY OF TREATMENT  The pt has been seen for 10 visits to address left ankle pain . Therapeutic interventions have included therapeutic exercise, therapeutic activity, neuromuscular re-education, manual treatment, modalities and patient education. CURRENT STATUS  Subjective functional status/changes:      % improved: 30%  Functional gains: no numbness in left foot, average pain level: 2-3/10  Functional limitations: walking on uneven surfaces, 4/10 at worst after work, limiting activities, decreased walking speed, stairs    FOTO: 43/100   Updated patient goal: \"stairs and walking. \"     Other Objective/Functional Measures:   SLS right: 21 seconds, left 3 seconds   5xSTS: 20 SECONDS with pain to 1/10  Left Ankle strength: EVR 5/5, INV 4+/5 p!, DF 5/5 PF 5/5  Left Posterior tib strength: 4+/5 without pain   DF AROM: 3 deg, EVR AROM: 15 deg      Previous objective measures from  on 2022:  Fall Risk Assessment: Does the patient have a fear of falling? YES If yes, what fall risk assessment was performed?   5xSTS: 20 seconds without UE support, pain increase to 3/10, decreased WB through LLE  SLS: right 13 seconds, left unable   Gait: decreased weight acceptance on the left and limited left heel strike      Heel raise height R>L and pain increase on L     Knee strength: 5/5 globally B/L         LEFT Ankle strength: DF 5/5, PF 5/5, EVR 4+/5, INV 4-/5 with pain; posterior tibialis 4-/5 with pain upon resistance      LEFT Ankle AROM before manual: DF -15 deg P!, EVR 5 deg p!, INV 35 deg       LEFT Ankle AROM after manual: DF +5 deg, EVR 15 deg     Palpation: TTP at distal posterior tib       ASSESSMENT/Changes in Function:   The patient presents for reassessment following 10 visits to address left ankle pain. She demonstrates improved left ankle and posterior tib strength since IE. No change in FOTO score or 5xSTS test since IE, however she demonstrated improved form and reported decreased pain with sit to stands. Continued limitation in SLS tolerance. Updated and reviewed HEP. The patient will benefit from continued skilled PT to address above limitations and improve QoL. Progress towards goals / Updated goals: The patient will improve left ankle INV and EVR strength to 5/5 to improve her walking tolerance. Current: Left Ankle strength: EVR 5/5, INV 4+/5 p!, DF 5/5 PF 5/5 and Left Posterior tib strength: 4+/5 without pain 08/25/2022  The patient will improve left ankle DF AROM to 10 deg and EVR AROM to 20 deg to improve her stair ambulation tolerance. Current: PROGRESSING 0 deg before manual, 5 deg after manual 08/23/2022, DF 3 deg, EVR 15 deg 08/25/2022  The patient will perform 5xSTS test in 18 seconds as an indicator of improved functional mobility. Current: 25 seconds (8/16/22), progressing 20 seconds with pain increase to 1/10 08/25/2022  The patient will tolerance SLS on the left for 5 seconds to improve her gait stability. Current: MSR bunion: 30 seconds with minimal sway, patient reported increased muscle fatigue following the exercise 08/02/2022, progressing 3 seconds 08/25/2022  The patient will improve FOTO score to 64/100 as a functional indicator of improved mobility. Current: no change 43/100 08/25/2022    Continue to progress towards above goals to be achieved in __12__  treatments. RECOMMENDATIONS  Continue 2x/week for 12 visits   If you have any questions/comments please contact us directly at [phone]. Thank you for allowing us to assist in the care of your patient.     Therapist Signature: Joyce De La Cruz PT Date: 8/25/2022     Time: 4:09 PM   NOTE TO PHYSICIAN:  PLEASE COMPLETE THE ORDERS BELOW AND FAX TO   [facility] at [facility] [place]: [phone]. If you are unable to process this request in 24 hours please contact our office: [phone].    ___ I have read the above report and request that my patient continue as recommended.   ___ I have read the above report and request that my patient continue therapy with the following changes/special instructions:_________________________________________________________   ___ I have read the above report and request that my patient be discharged from therapy.      Physician Signature:        Date:       Time:    Darlene Ríos MD

## 2022-08-30 ENCOUNTER — APPOINTMENT (OUTPATIENT)
Dept: PHYSICAL THERAPY | Age: 57
End: 2022-08-30
Payer: COMMERCIAL

## 2022-09-01 ENCOUNTER — APPOINTMENT (OUTPATIENT)
Dept: PHYSICAL THERAPY | Age: 57
End: 2022-09-01
Payer: COMMERCIAL

## 2022-09-06 ENCOUNTER — HOSPITAL ENCOUNTER (OUTPATIENT)
Dept: PHYSICAL THERAPY | Age: 57
Discharge: HOME | End: 2022-09-06
Payer: COMMERCIAL

## 2022-09-06 PROCEDURE — 97016 VASOPNEUMATIC DEVICE THERAPY: CPT

## 2022-09-06 PROCEDURE — 97140 MANUAL THERAPY 1/> REGIONS: CPT

## 2022-09-06 NOTE — PROGRESS NOTES
PT DAILY TREATMENT NOTE     Patient Name: Mari Ramirez  Date:2022  : 1965  [x]  Patient  Verified  Payor: BLUE CROSS / Plan: [facility] PPO / Product Type: PPO /    In time: 4:18  Out time: 5:28  Total Treatment Time (min):  70  Visit #: 1 of 12    Medicare/BCBS Only   Total Timed Codes (min):  55 1:1 Treatment Time:  50       Treatment Area: Left ankle pain [M25.572]    SUBJECTIVE  Pain Level (0-10 scale): 4-5/10  Any medication changes, allergies to medications, adverse drug reactions, diagnosis change, or new procedure performed?: [x] No    [] Yes (see summary sheet for update)  Subjective functional status/changes:   [] No changes reported    I saw the doctor today and she is sending me for x-rays and then a CT scan or MRI because it is still not getting any better. I mean I feel better after I leave therapy for like a day but then it comes back     OBJECTIVE    Modality rationale: decrease pain to improve the patients ability to perform ADL's with greater ease.     Min Type Additional Details    [] Estim:  []Unatt       []IFC  []Premod                        []Other:  []w/ice   []w/heat  Position:  Location:    [] Estim: []Att    []TENS instruct  []NMES                    []Other:  []w/US   []w/ice   []w/heat  Position:  Location:    []  Traction: [] Cervical       []Lumbar                       [] Prone          []Supine                       []Intermittent   []Continuous Lbs:  [] before manual  [] after manual    []  Ultrasound: []Continuous   [] Pulsed                           []1MHz   []3MHz W/cm2:  Location:    []  Iontophoresis with dexamethasone         Location: [] Take home patch   [] In clinic    []  Ice     []  heat  []  Ice massage  []  Laser   []  Anodyne Position:  Location:    []  Laser with stim  []  Other:  Position:  Location:   15 [x]  Vasopneumatic Device  Pre-treatment girth: 45 cm  Post-treatment girth: 45 cm   Measured at landmark location: figure 8 Pressure:       [] lo [x] med [] hi   Temperature: [x] lo [] med [] Hi    Position: Reclined   Location: left ankle    [x] Skin assessment post-treatment:  [x]intact []redness- no adverse reaction    []redness - adverse reaction:   Vasopnuematic compression justification:  Per bilateral girth measures taken and listed above the edema is considered significant and having an impact on the patient's strength and ADLs      13  min Therapeutic Exercise:  [x] See flow sheet :  Bike  DF stretch at stairs   Half kneeling DF stretch at wall    Rationale: increase ROM and increase strength to improve the patients standing tolerance with decreased pain levels. 30 min Therapeutic Activity:  [x]  See flow sheet :  HR with left shift   Mini squats-TC  Star taps for SLS tolerance  March with hold    Rationale: increase ROM, increase strength, and improve balance  to improve the patients functional mobility for ease with ADL's.       12 min Manual Therapy:  calcaneal mobs, posterior talar mobs, distraction     STM/IASTM to left posterior tib tendon   The manual therapy interventions were performed at a separate and distinct time from the therapeutic activities interventions. Rationale: decrease pain and increase ROM to increase functional mobility in the ankle for ease with ADL's such as stair negotiation. With   [] TE   [] TA   [] neuro   [] other: Patient Education: [x] Review HEP    [] Progressed/Changed HEP based on:   [] positioning   [x] body mechanics   [] transfers   [] heat/ice application    [] other:      Other Objective/Functional Measures:     Patient will have x-ray tonight to foot and ankle and then depending results with be schedule for a CT scan or MRI and then follow up with ortho MD instead of PCP    Pain Level (0-10 scale) post treatment: 2/10    ASSESSMENT/Changes in Function:   Patient continues to achieve minimal results with minimal carryover from session to session .  Additional studies ordered to further diagnosis patient's ankle/foot to determine mechanism of injury   Patient will continue to benefit from skilled PT services to modify and progress therapeutic interventions, address functional mobility deficits, address ROM deficits, and address strength deficits to attain remaining goals. [x]  See Plan of Care  []  See progress note/recertification  []  See Discharge Summary           Progress towards goals / Updated goals:08/25/2022  The patient will improve left ankle INV and EVR strength to 5/5 to improve her walking tolerance. Current: Left Ankle strength: EVR 5/5, INV 4+/5 p!, DF 5/5 PF 5/5 and Left Posterior tib strength: 4+/5 without pain 08/25/2022  The patient will improve left ankle DF AROM to 10 deg and EVR AROM to 20 deg to improve her stair ambulation tolerance. Current: PROGRESSING 0 deg before manual, 5 deg after manual 08/23/2022, DF 3 deg, EVR 15 deg 08/25/2022  The patient will perform 5xSTS test in 18 seconds as an indicator of improved functional mobility. Current: 25 seconds (8/16/22), progressing 20 seconds with pain increase to 1/10 08/25/2022  The patient will tolerance SLS on the left for 5 seconds to improve her gait stability. Current: MSR bunion: 30 seconds with minimal sway, patient reported increased muscle fatigue following the exercise 08/02/2022, progressing 3 seconds 08/25/2022  The patient will improve FOTO score to 64/100 as a functional indicator of improved mobility.   Current: no change 43/100 08/25/2022  PLAN  [x]  Upgrade activities as tolerated     [x]  Continue plan of care  []  Update interventions per flow sheet       []  Discharge due to:_  []  Other:    General Amara PTA 9/6/2022  2:55 PM    Future Appointments   Date Time Provider Lupillo James   9/8/2022  4:15 PM Zuleika Robles PT [facility]   9/13/2022  4:15 PM Smooth Sullivan [facility]   9/15/2022  4:15 PM Isaac Spencer PTA [facility]   9/20/2022  4:15 PM Zuleika Robles, PT [facility]   9/22/2022  4:15 PM Sin Stewart, PTA [facility]   9/27/2022  4:15 PM Sin Stewart, PTA [facility]   9/29/2022  4:15 PM Sin Stewart, PTA [facility]

## 2022-09-08 ENCOUNTER — HOSPITAL ENCOUNTER (OUTPATIENT)
Dept: PHYSICAL THERAPY | Age: 57
Discharge: HOME | End: 2022-09-08
Payer: COMMERCIAL

## 2022-09-08 PROCEDURE — 97140 MANUAL THERAPY 1/> REGIONS: CPT

## 2022-09-08 PROCEDURE — 97530 THERAPEUTIC ACTIVITIES: CPT

## 2022-09-08 PROCEDURE — 97110 THERAPEUTIC EXERCISES: CPT

## 2022-09-08 PROCEDURE — 97016 VASOPNEUMATIC DEVICE THERAPY: CPT

## 2022-09-08 NOTE — PROGRESS NOTES
PT DAILY TREATMENT NOTE     Patient Name: Radha Cross  Date:2022  : 1965  [x]  Patient  Verified  Payor: BLUE CROSS / Plan: [facility] PPO / Product Type: PPO /    In time: 4:21  Out time: 5:20   Total Treatment Time (min):  60  Visit #: 2 of 12    Medicare/BCBS Only   Total Timed Codes (min):  50 1:1 Treatment Time:  50       Treatment Area: Left ankle pain [M25.572]    SUBJECTIVE  Pain Level (0-10 scale): 2-3/10  Any medication changes, allergies to medications, adverse drug reactions, diagnosis change, or new procedure performed?: [x] No    [] Yes (see summary sheet for update)  Subjective functional status/changes:   [] No changes reported  The patient reports x-rays showed no fractures, but that her tendons are inflamed. OBJECTIVE    Modality rationale: decrease pain to improve the patients ability to perform ADL's with greater ease.     Min Type Additional Details    [] Estim:  []Unatt       []IFC  []Premod                        []Other:  []w/ice   []w/heat  Position:  Location:    [] Estim: []Att    []TENS instruct  []NMES                    []Other:  []w/US   []w/ice   []w/heat  Position:  Location:    []  Traction: [] Cervical       []Lumbar                       [] Prone          []Supine                       []Intermittent   []Continuous Lbs:  [] before manual  [] after manual    []  Ultrasound: []Continuous   [] Pulsed                           []1MHz   []3MHz W/cm2:  Location:    []  Iontophoresis with dexamethasone         Location: [] Take home patch   [] In clinic    []  Ice     []  heat  []  Ice massage  []  Laser   []  Anodyne Position:  Location:    []  Laser with stim  []  Other:  Position:  Location:   10 [x]  Vasopneumatic Device  Pre-treatment girth: 45 cm  Post-treatment girth: 45 cm   Measured at landmark location: figure 8 Pressure:       [] lo [x] med [] hi   Temperature: [x] lo [] med [] Hi    Position: Reclined   Location: left ankle    [x] Skin assessment post-treatment:  [x]intact []redness- no adverse reaction    []redness - adverse reaction:   Vasopnuematic compression justification:  Per bilateral girth measures taken and listed above the edema is considered significant and having an impact on the patient's strength and ADLs      12    min Therapeutic Exercise:  [x] See flow sheet :  Bike   Half kneeling DF stretch at wall    Rationale: increase ROM and increase strength to improve the patients standing tolerance with decreased pain levels. 23  min Therapeutic Activity:  [x]  See flow sheet :  HR with left shift   Mini squats-TC  Star taps for SLS tolerance  March with hold   Sit to stand  Step ups fwd, lat - 6 inch    Rationale: increase ROM, increase strength, and improve balance  to improve the patients functional mobility for ease with ADL's. 15 min Manual Therapy:  calcaneal mobs, posterior talar mobs, distraction     STM/IASTM to left posterior tib tendon   The manual therapy interventions were performed at a separate and distinct time from the therapeutic activities interventions. Rationale: decrease pain and increase ROM to increase functional mobility in the ankle for ease with ADL's such as stair negotiation. With   [] TE   [] TA   [] neuro   [] other: Patient Education: [x] Review HEP    [] Progressed/Changed HEP based on:   [] positioning   [x] body mechanics   [] transfers   [] heat/ice application    [] other:      Other Objective/Functional Measures:   Increased repetitions of heel raises  Left ankle strength: 5/5 globally, left post tib strength: 4+/5    Pain Level (0-10 scale) post treatment: 2 /10    ASSESSMENT/Changes in Function:   The patient was able to tolerate increased repetitions of heel raises with lateral shift to the left today. She presents with improved left ankle strength making steady gains towards LTG's. Noted continued tenderness at left distal posterior tib tendon.  Will progress as tolerated nv.      Patient will continue to benefit from skilled PT services to modify and progress therapeutic interventions, address functional mobility deficits, address ROM deficits, and address strength deficits to attain remaining goals. [x]  See Plan of Care  []  See progress note/recertification  []  See Discharge Summary           Progress towards goals / Updated goals:08/25/2022  The patient will improve left ankle INV and EVR strength to 5/5 to improve her walking tolerance. Current: Left Ankle strength: EVR 5/5, INV 4+/5 p!, DF 5/5 PF 5/5 and Left Posterior tib strength: 4+/5 without pain 08/25/2022, EVR 5/5 and INV 5/5, left posterior tib 4+/5 09/08/2022  The patient will improve left ankle DF AROM to 10 deg and EVR AROM to 20 deg to improve her stair ambulation tolerance. Current: PROGRESSING 0 deg before manual, 5 deg after manual 08/23/2022, DF 3 deg, EVR 15 deg 08/25/2022  The patient will perform 5xSTS test in 18 seconds as an indicator of improved functional mobility. Current: 25 seconds (8/16/22), progressing 20 seconds with pain increase to 1/10 08/25/2022  The patient will tolerance SLS on the left for 5 seconds to improve her gait stability. Current: MSR bunion: 30 seconds with minimal sway, patient reported increased muscle fatigue following the exercise 08/02/2022, progressing 3 seconds 08/25/2022  The patient will improve FOTO score to 64/100 as a functional indicator of improved mobility.   Current: no change 43/100 08/25/2022  PLAN  [x]  Upgrade activities as tolerated     [x]  Continue plan of care  []  Update interventions per flow sheet       []  Discharge due to:_  []  Other:    Michael Harrell, PT 9/8/2022  2:55 PM    Future Appointments   Date Time Provider Lupillo James   9/13/2022  4:15 PM Whitman Morelle [facility]   9/15/2022  4:15 PM Bartolo Ross PTA [facility]   9/20/2022  4:15 PM Mary Grace Knox, PT [facility]   9/22/2022  4:15 PM Bartolo Ross PTA [facility] 9/27/2022  4:15 PM Yanci Solorzano PTA [facility]   9/29/2022  4:15 PM Yanci Solorzano PTA [facility]

## 2022-09-13 ENCOUNTER — HOSPITAL ENCOUNTER (OUTPATIENT)
Dept: PHYSICAL THERAPY | Age: 57
Discharge: HOME | End: 2022-09-13
Payer: COMMERCIAL

## 2022-09-13 PROCEDURE — 97016 VASOPNEUMATIC DEVICE THERAPY: CPT

## 2022-09-13 PROCEDURE — 97035 APP MDLTY 1+ULTRASOUND EA 15: CPT

## 2022-09-13 PROCEDURE — 97530 THERAPEUTIC ACTIVITIES: CPT

## 2022-09-13 NOTE — PROGRESS NOTES
PT DAILY TREATMENT NOTE     Patient Name: Brooke Romero Old  Date:2022  : 1965  [x]  Patient  Verified  Payor: BLUE CROSS / Plan: [facility] PPO / Product Type: PPO /    In time: 4:20  Out time: 5:30  Total Treatment Time (min): 70  Visit #: 3 of 12    Medicare/BCBS Only   Total Timed Codes (min):  55 1:1 Treatment Time:  50       Treatment Area: Left ankle pain [M25.572]    SUBJECTIVE  Pain Level (0-10 scale): 1/10  Any medication changes, allergies to medications, adverse drug reactions, diagnosis change, or new procedure performed?: [x] No    [] Yes (see summary sheet for update)  Subjective functional status/changes:   [] No changes reported  I was really busy this weekend with getting ready with guests that are coming to my house and I did a lot of house work, walking, standing, stairs. I was able to make an appointment with Dr Ange Crabtree but it is not until 10/12/2022    OBJECTIVE    Modality rationale: decrease pain to improve the patients ability to perform ADL's with greater ease.     Min Type Additional Details    [] Estim:  []Unatt       []IFC  []Premod                        []Other:  []w/ice   []w/heat  Position:  Location:    [] Estim: []Att    []TENS instruct  []NMES                    []Other:  []w/US   []w/ice   []w/heat  Position:  Location:    []  Traction: [] Cervical       []Lumbar                       [] Prone          []Supine                       []Intermittent   []Continuous Lbs:  [] before manual  [] after manual   8 [x]  Ultrasound: []Continuous   [x] Pulsed                           []1MHz   [x]3MHz W/cm2: 1.5 w/cm2 pulse   Location: (L) to medial malleoli     []  Iontophoresis with dexamethasone         Location: [] Take home patch   [] In clinic    []  Ice     []  heat  []  Ice massage  []  Laser   []  Anodyne Position:  Location:    []  Laser with stim  []  Other:  Position:  Location:   15 [x]  Vasopneumatic Device  Pre-treatment girth: 45 cm  Post-treatment girth: 45 cm   Measured at landmark location: figure 8 Pressure:       [] lo [x] med [] hi   Temperature: [x] lo [] med [] Hi    Position: Reclined   Location: left ankle    [x] Skin assessment post-treatment:  [x]intact []redness- no adverse reaction    []redness - adverse reaction:   Vasopnuematic compression justification:  Per bilateral girth measures taken and listed above the edema is considered significant and having an impact on the patient's strength and ADLs      12    min Therapeutic Exercise:  [x] See flow sheet :  Bike   Half kneeling DF stretch at wall   Hamstring stretch   3 way hip with OTB   Rationale: increase ROM and increase strength to improve the patients standing tolerance with decreased pain levels. 35  min Therapeutic Activity:  [x]  See flow sheet :  HR with left shift   Mini squats-  Star taps for SLS tolerance  March with hold   Sit to stand  Step ups fwd, lat - 6 inch   Side stepping with OTB   Rationale: increase ROM, increase strength, and improve balance  to improve the patients functional mobility for ease with ADL's.       held min Manual Therapy:  calcaneal mobs, posterior talar mobs, distraction     STM/IASTM to left posterior tib tendon   The manual therapy interventions were performed at a separate and distinct time from the therapeutic activities interventions. Rationale: decrease pain and increase ROM to increase functional mobility in the ankle for ease with ADL's such as stair negotiation. With   [] TE   [] TA   [] neuro   [] other: Patient Education: [x] Review HEP    [] Progressed/Changed HEP based on:   [] positioning   [x] body mechanics   [] transfers   [] heat/ice application    [] other:      Other Objective/Functional Measures: Added pulsed US and held manual      GOALS  The patient will tolerance SLS on the left for 5 seconds to improve her gait stability.   Current: progressing 3 seconds and then 5 sec with one finger tip to correct  09/13/2022    Pain Level (0-10 scale) post treatment: 1 /10    ASSESSMENT/Changes in Function:   Patient present with increase visible and palpable edema to the medial malleoli - especially anterior aspect of the medial malleoli. No change with circumference with measuring for the vaso - figure 8 - compare to last session and no change after vaso. Attempted today pulsed US f/b vaso to assist with decreasing edema to this area. Will assess the effects of the ultrasound next visit. Patient will continue to benefit from skilled PT services to modify and progress therapeutic interventions, address functional mobility deficits, address ROM deficits, and address strength deficits to attain remaining goals. [x]  See Plan of Care  []  See progress note/recertification  []  See Discharge Summary           Progress towards goals / Updated goals:08/25/2022  The patient will improve left ankle INV and EVR strength to 5/5 to improve her walking tolerance. Current: Left Ankle strength: EVR 5/5, INV 4+/5 p!, DF 5/5 PF 5/5 and Left Posterior tib strength: 4+/5 without pain 08/25/2022, EVR 5/5 and INV 5/5, left posterior tib 4+/5 09/08/2022  The patient will improve left ankle DF AROM to 10 deg and EVR AROM to 20 deg to improve her stair ambulation tolerance. Current: PROGRESSING 0 deg before manual, 5 deg after manual 08/23/2022, DF 3 deg, EVR 15 deg 08/25/2022  The patient will perform 5xSTS test in 18 seconds as an indicator of improved functional mobility. Current: 25 seconds (8/16/22), progressing 20 seconds with pain increase to 1/10 08/25/2022  The patient will tolerance SLS on the left for 5 seconds to improve her gait stability. Current: progressing 3 seconds and then 5 sec with one finger tip to correct  09/13/2022  The patient will improve FOTO score to 64/100 as a functional indicator of improved mobility.   Current: no change 43/100 08/25/2022  PLAN  [x]  Upgrade activities as tolerated     [x] Continue plan of care  []  Update interventions per flow sheet       []  Discharge due to:_  []  Other:    Jolie Hewitt, [facility] 9/13/2022  2:55 PM    Future Appointments   Date Time Provider Lupillo James   9/15/2022  4:15 PM Leon Marshal [facility]   9/20/2022  4:15 PM Lisa Ceja, PT [facility]   9/22/2022  4:15 PM Mikel Abraham, PTA [facility]   9/27/2022  4:15 PM Mikel Abraham, PTA [facility]   9/29/2022  4:15 PM Mikel Abraham, PTA [facility]

## 2022-09-15 ENCOUNTER — HOSPITAL ENCOUNTER (OUTPATIENT)
Dept: PHYSICAL THERAPY | Age: 57
Discharge: HOME | End: 2022-09-15
Payer: COMMERCIAL

## 2022-09-15 PROCEDURE — 97140 MANUAL THERAPY 1/> REGIONS: CPT

## 2022-09-15 PROCEDURE — 97530 THERAPEUTIC ACTIVITIES: CPT

## 2022-09-15 PROCEDURE — 97016 VASOPNEUMATIC DEVICE THERAPY: CPT

## 2022-09-15 NOTE — PROGRESS NOTES
PT DAILY TREATMENT NOTE     Patient Name: Ambrocio Rodriguez Old  Date:9/15/2022  : 1965  [x]  Patient  Verified  Payor: BLUE CROSS / Plan: [facility] PPO / Product Type: PPO /    In time: 4:18  Out time: 5:25  Total Treatment Time (min): 79  Visit #: 4 of 12    Medicare/BCBS Only   Total Timed Codes (min):  52 1:1 Treatment Time:  47       Treatment Area: Left ankle pain [M25.572]    SUBJECTIVE  Pain Level (0-10 scale): 1-2/10  Any medication changes, allergies to medications, adverse drug reactions, diagnosis change, or new procedure performed?: [x] No    [] Yes (see summary sheet for update)  Subjective functional status/changes:   [] No changes reported  I don't think that the ultrasound that you did last visit seemed to have done anything. I just now feel like I have to really plan my day and not to have to walk too much because then it really makes it hurt a lot and then I am miserable     OBJECTIVE    Modality rationale: decrease pain to improve the patients ability to perform ADL's with greater ease.     Min Type Additional Details    [] Estim:  []Unatt       []IFC  []Premod                        []Other:  []w/ice   []w/heat  Position:  Location:    [] Estim: []Att    []TENS instruct  []NMES                    []Other:  []w/US   []w/ice   []w/heat  Position:  Location:    []  Traction: [] Cervical       []Lumbar                       [] Prone          []Supine                       []Intermittent   []Continuous Lbs:  [] before manual  [] after manual    []  Ultrasound: []Continuous   [] Pulsed                           []1MHz   []3MHz W/cm2:     []  Iontophoresis with dexamethasone         Location: [] Take home patch   [] In clinic    []  Ice     []  heat  []  Ice massage  []  Laser   []  Anodyne Position:  Location:    []  Laser with stim  []  Other:  Position:  Location:   15 [x]  Vasopneumatic Device  Pre-treatment girth: 45 cm  Post-treatment girth: 45 cm   Measured at landmark location: figure 8 Pressure:       [] lo [x] med [] hi   Temperature: [x] lo [] med [] Hi    Position: Reclined   Location: left ankle    [x] Skin assessment post-treatment:  [x]intact []redness- no adverse reaction    []redness - adverse reaction:   Vasopnuematic compression justification:  Per bilateral girth measures taken and listed above the edema is considered significant and having an impact on the patient's strength and ADLs      12    min Therapeutic Exercise:  [x] See flow sheet :  Bike   Half kneeling DF stretch at wall   Hamstring stretch   3 way hip with OTB   Rationale: increase ROM and increase strength to improve the patients standing tolerance with decreased pain levels. 25  min Therapeutic Activity:  [x]  See flow sheet :  HR with left shift over 1/2 foam roll   Mini squats-  Star taps for SLS tolerance  March with hold   Sit to stand  Step ups fwd, lat - 6 inch   Side stepping with OTB   Rationale: increase ROM, increase strength, and improve balance  to improve the patients functional mobility for ease with ADL's. 15 min Manual Therapy:  calcaneal mobs, posterior talar mobs, distraction     STM/IASTM to left posterior tib tendon   The manual therapy interventions were performed at a separate and distinct time from the therapeutic activities interventions. Rationale: decrease pain and increase ROM to increase functional mobility in the ankle for ease with ADL's such as stair negotiation.               With   [] TE   [] TA   [] neuro   [] other: Patient Education: [x] Review HEP    [] Progressed/Changed HEP based on:   [] positioning   [x] body mechanics   [] transfers   [] heat/ice application    [] other:      Other Objective/Functional Measures:   Patient reported no change in status after ultrasound performed last session , d/c use of US resumed manual   Changed shift with HR to - HR with left shift over 1/2 foam roll   SLS on (L) ankle for 5 sec       LTG#4  The patient will tolerance SLS on the left for 5 seconds to improve her gait stability. Current: MET 9/15/2022    Pain Level (0-10 scale) post treatment: 1 /10    ASSESSMENT/Changes in Function:   Decrease edema noted over the medial malleoli and tolerated manual well, improved balance with SLS and tandem with decrease reports of pain       Patient will continue to benefit from skilled PT services to modify and progress therapeutic interventions, address functional mobility deficits, address ROM deficits, and address strength deficits to attain remaining goals. [x]  See Plan of Care  []  See progress note/recertification  []  See Discharge Summary           Progress towards goals / Updated goals:08/25/2022  The patient will improve left ankle INV and EVR strength to 5/5 to improve her walking tolerance. Current: Left Ankle strength: EVR 5/5, INV 4+/5 p!, DF 5/5 PF 5/5 and Left Posterior tib strength: 4+/5 without pain 08/25/2022, EVR 5/5 and INV 5/5, left posterior tib 4+/5 09/08/2022  The patient will improve left ankle DF AROM to 10 deg and EVR AROM to 20 deg to improve her stair ambulation tolerance. Current: PROGRESSING 0 deg before manual, 5 deg after manual 08/23/2022, DF 3 deg, EVR 15 deg 08/25/2022  The patient will perform 5xSTS test in 18 seconds as an indicator of improved functional mobility. Current: 25 seconds (8/16/22), progressing 20 seconds with pain increase to 1/10 08/25/2022  The patient will tolerance SLS on the left for 5 seconds to improve her gait stability. Current: MET 9/15/2022  The patient will improve FOTO score to 64/100 as a functional indicator of improved mobility.   Current: no change 43/100 08/25/2022  PLAN  [x]  Upgrade activities as tolerated     [x]  Continue plan of care  []  Update interventions per flow sheet       []  Discharge due to:_  []  Other:    Bria Pablo PTA 9/15/2022  2:55 PM    Future Appointments   Date Time Provider Lupillo James   9/20/2022  4:15 PM Ana Arnold Harley Rotunda [facility]   9/22/2022  4:15 PM Yanci Solorzano, [place] [facility]   9/27/2022  4:15 PM Yanci Solorzano, [place] [facility]   9/29/2022  4:15 PM Yanci Solorzano, PTA [facility]

## 2022-09-20 ENCOUNTER — HOSPITAL ENCOUNTER (OUTPATIENT)
Dept: PHYSICAL THERAPY | Age: 57
Discharge: HOME | End: 2022-09-20
Payer: COMMERCIAL

## 2022-09-20 PROCEDURE — 97016 VASOPNEUMATIC DEVICE THERAPY: CPT

## 2022-09-20 PROCEDURE — 97110 THERAPEUTIC EXERCISES: CPT

## 2022-09-20 PROCEDURE — 97140 MANUAL THERAPY 1/> REGIONS: CPT

## 2022-09-20 PROCEDURE — 97530 THERAPEUTIC ACTIVITIES: CPT

## 2022-09-20 NOTE — PROGRESS NOTES
PT DAILY TREATMENT NOTE     Patient Name: Rupesh Lares Old  Date:2022  : 1965  [x]  Patient  Verified  Payor: BLUE CROSS / Plan: [facility] PPO / Product Type: PPO /    In time: 4:18  Out time: 5:25  Total Treatment Time (min): 67  Visit #: 5 of 12    Medicare/BCBS Only   Total Timed Codes (min):  52 1:1 Treatment Time:  47       Treatment Area: Left ankle pain [M25.572]    SUBJECTIVE  Pain Level (0-10 scale): 3-4/10  Any medication changes, allergies to medications, adverse drug reactions, diagnosis change, or new procedure performed?: [x] No    [] Yes (see summary sheet for update)  Subjective functional status/changes:   [] No changes reported  I am able to see the doctor for my ankle next week instead of in October which is better because this thing is still killing me and I just don't think it is getting better at all       OBJECTIVE    Modality rationale: decrease pain to improve the patients ability to perform ADL's with greater ease.     Min Type Additional Details    [] Estim:  []Unatt       []IFC  []Premod                        []Other:  []w/ice   []w/heat  Position:  Location:    [] Estim: []Att    []TENS instruct  []NMES                    []Other:  []w/US   []w/ice   []w/heat  Position:  Location:    []  Traction: [] Cervical       []Lumbar                       [] Prone          []Supine                       []Intermittent   []Continuous Lbs:  [] before manual  [] after manual    []  Ultrasound: []Continuous   [] Pulsed                           []1MHz   []3MHz W/cm2:     []  Iontophoresis with dexamethasone         Location: [] Take home patch   [] In clinic    []  Ice     []  heat  []  Ice massage  []  Laser   []  Anodyne Position:  Location:    []  Laser with stim  []  Other:  Position:  Location:   15 [x]  Vasopneumatic Device  Pre-treatment girth: 45 cm  Post-treatment girth: 45 cm   Measured at landmark location: figure 8 Pressure:       [] lo [x] med [] hi Temperature: [x] lo [] med [] Hi    Position: Reclined   Location: left ankle    [x] Skin assessment post-treatment:  [x]intact []redness- no adverse reaction    []redness - adverse reaction:   Vasopnuematic compression justification:  Per bilateral girth measures taken and listed above the edema is considered significant and having an impact on the patient's strength and ADLs      17   min Therapeutic Exercise:  [x] See flow sheet :  Bike   Half kneeling DF stretch at wall   Hamstring stretch   3 way hip with OTB   Rationale: increase ROM and increase strength to improve the patients standing tolerance with decreased pain levels. 20  min Therapeutic Activity:  [x]  See flow sheet :  HR with left shift over 1/2 foam roll   Mini squats-  Star taps for SLS tolerance  March with hold   Sit to stand  Step ups fwd, lat - 6 inch   Side stepping with OTB   Rationale: increase ROM, increase strength, and improve balance  to improve the patients functional mobility for ease with ADL's. 15 min Manual Therapy:  calcaneal mobs, posterior talar mobs, distraction     STM/IASTM to left posterior tib tendon   The manual therapy interventions were performed at a separate and distinct time from the therapeutic activities interventions. Rationale: decrease pain and increase ROM to increase functional mobility in the ankle for ease with ADL's such as stair negotiation.               With   [] TE   [] TA   [] neuro   [] other: Patient Education: [x] Review HEP    [] Progressed/Changed HEP based on:   [] positioning   [x] body mechanics   [] transfers   [] heat/ice application    [] other:      Other Objective/Functional Measures:   Patient reported increase pain with 1/2 kneel on (R) leg on foam and with bringing the (L) knee towards wall to provide stretch to the achilles/gastro - held that stretch and patient was able to perform incline stretch with less pain and improved stretch   Decreased visible edema noted to the medial malleoli     Pain Level (0-10 scale) post treatment: 1 /10    ASSESSMENT/Changes in Function:   Secondary to patient continues to report no long term relief of symptoms. Patient reports that after therapy patient will feel \"good\" for roughly 24 hours prior to symptoms returning, will place patient on hold until consult with ortho foot/ankle MD that is scheduled next week and for patient to contact office regarding if patient is to continue with therapy per ortho recommendation      Patient will continue to benefit from skilled PT services to modify and progress therapeutic interventions, address functional mobility deficits, address ROM deficits, and address strength deficits to attain remaining goals. [x]  See Plan of Care  []  See progress note/recertification  []  See Discharge Summary           Progress towards goals / Updated goals:08/25/2022  The patient will improve left ankle INV and EVR strength to 5/5 to improve her walking tolerance. Current: Left Ankle strength: EVR 5/5, INV 4+/5 p!, DF 5/5 PF 5/5 and Left Posterior tib strength: 4+/5 without pain 08/25/2022, EVR 5/5 and INV 5/5, left posterior tib 4+/5 09/08/2022  The patient will improve left ankle DF AROM to 10 deg and EVR AROM to 20 deg to improve her stair ambulation tolerance. Current: PROGRESSING 0 deg before manual, 5 deg after manual 08/23/2022, DF 3 deg, EVR 15 deg 08/25/2022  The patient will perform 5xSTS test in 18 seconds as an indicator of improved functional mobility. Current: 25 seconds (8/16/22), progressing 20 seconds with pain increase to 1/10 08/25/2022  The patient will tolerance SLS on the left for 5 seconds to improve her gait stability. Current: MET 9/15/2022  The patient will improve FOTO score to 64/100 as a functional indicator of improved mobility.   Current: no change 43/100 08/25/2022  PLAN  [x]  Upgrade activities as tolerated     [x]  Continue plan of care  []  Update interventions per flow sheet       [] Discharge due to:_  [x]  Other:will place patient on hold until consult with ortho foot/ankle MD that is scheduled next week and for patient to contact office regarding if patient is to continue with therapy per ortho recommendation     Anastacio Eng PTA 9/20/2022  2:55 PM    Future Appointments   Date Time Provider Lupillo James   9/22/2022  4:15 PM Zaina Negri [facility]   9/27/2022  4:15 PM Jamila Ellis PTA [facility]   9/29/2022  4:15 PM Mirna Coleman PT [facility]

## 2022-09-22 ENCOUNTER — APPOINTMENT (OUTPATIENT)
Dept: PHYSICAL THERAPY | Age: 57
End: 2022-09-22
Payer: COMMERCIAL

## 2022-09-27 ENCOUNTER — APPOINTMENT (OUTPATIENT)
Dept: PHYSICAL THERAPY | Age: 57
End: 2022-09-27
Payer: COMMERCIAL

## 2022-09-29 ENCOUNTER — APPOINTMENT (OUTPATIENT)
Dept: PHYSICAL THERAPY | Age: 57
End: 2022-09-29
Payer: COMMERCIAL

## 2022-10-20 NOTE — PROGRESS NOTES
[address] [facility]  [address], [place], [place]  Phone [phone]  Fax  SUMMARY  Patient Name: Jesusita Reed : 1965   Treatment/Medical Diagnosis: Left ankle pain [M25.572]   Referral Source: Flash Mccormack MD     Date of Initial Visit: 2022 Attended Visits: 15 Missed Visits: 0       Summary of Care: Thank you for referring this pleasant patient. Hever Dorsey has completed 15 proposed sessions   Patient did not return to physical therapy after 15 visit on 2022 - reason - therapist placed patient on 30 day hold secondary to lack of progress with therapy and to return to referring MD for additional advise/consult/testing/imaging as needed. Patient did not contact office for any additional need or follow up visits after the 30 day hold-   Will discharge patient at this time and if patient contact office after today will advise patient that is any additional follow up or recommendation is needed to return to referring physician. Reason for Discharge:  [] The patient was non-compliant with attendance. [] The patient could not be contacted to schedule further therapy sessions. [] The patient has been discharged based on the orders of the referring physician. [x] The patient  to be discharged due to: need to return to referring MD   [] Patient has met all goals or max benefit has been achieved      If you have any questions/comments please contact us directly at [phone]. Thank you for allowing us to assist in the care of your patient.     Therapist Signature: Pete Presley PTA Date: 10/20/2022   Therapist   Signature  Shun Weir DPT Time: 7:46 AM

## 2023-02-08 ENCOUNTER — HOSPITAL ENCOUNTER (OUTPATIENT)
Dept: PHYSICAL THERAPY | Age: 58
Discharge: HOME | End: 2023-02-08
Payer: COMMERCIAL

## 2023-02-08 PROCEDURE — 97116 GAIT TRAINING THERAPY: CPT

## 2023-02-08 PROCEDURE — 97162 PT EVAL MOD COMPLEX 30 MIN: CPT

## 2023-02-08 PROCEDURE — 97110 THERAPEUTIC EXERCISES: CPT

## 2023-02-08 NOTE — PROGRESS NOTES
[address] [facility]ERAPY AT [facility]  [address] [facility] [address] [address], Gigi Brock  Phone [phone]  Fax [phone] / [address] [address] PHYSICAL THERAPY SERVICES  Patient Name: Hollie Reed : 1965   Medical   Diagnosis: Posterior tibial tendinitis, left leg [M76.822] Treatment Diagnosis: Left ankle pain   Onset Date: 2022     Referral Source: Isela Winters MD Start of Care [facility] [place], operated [facility]): 2023   Prior Hospitalization: See medical history Provider #: [id number]   Prior Level of Function: IND, bathroom and bedroom on second floor of home, 4 GIOVANNA home with right handrails, enjoyed swimming and walking    Comorbidities: None reported    Medications: Verified on Patient Summary List   The Plan of Care and following information is based on the information from the initial evaluation.   =================================================================================  Assessment / key information:    Subjective functional status/changes: The patient for pre op visit with c/o left ankle pain. She is scheduled for posterior tibialis tendon repair on 02/15/2023. She presents with axillary crutches and knee scooter, states she has FWW at home. Her bedroom and bathroom are on the second floor, states she has elevated toilet seat with grab bars, tub shower. She has about 4 GIOVANNA home with handrails on the right. The patient will be receiving assistance from  for transfers and self care post op. Will assess FOTO at first post op visit     Other Objective/Functional Measures:        Patient presents with brace on left ankle - no gait deviations present      Fall Risk Assessment: Does the patient have a fear of falling? YES If yes, what fall risk assessment was performed?       Fit patient for B/L axillary crutches   Instructed patient on step through pattern using axillary crutches   Attempted therapy stairs using B/L crutches - patient expressed increased apprehension due to stability and increased pain at right hip   Assessed knee scooter         ASSESSMENT/Changes in Function:   The patient presents for evaluation for pre-op instruction prior to sx on 02/15/2023 for left posterior tibialis. The patient presents today with brace on left ankle, no gait deviations present. Fit patient for B/L axillary crutches and instructed on step-through gait pattern with precautions as she will be NWB post op. Attempted therapy stairs using B/L crutches however the patient expressed increased apprehension due to stability and increased pain at right hip. Educated patient and her  on safety with wheelchair, FWW and B/L axillary crutch safety with transfers and stairs- provided educational printouts.  The patient will benefit from skilled PT to assess post-op limitations and improve functional mobility.   =================================================================================  Eval Complexity: History: MEDIUM  Complexity : 1-2 comorbidities / personal factors will impact the outcome/ POC Exam:MEDIUM Complexity : 3 Standardized tests and measures addressing body structure, function, activity limitation and / or participation in recreation  Presentation: MEDIUM Complexity : Evolving with changing characteristics  Clinical Decision Making:Overall Complexity:MEDIUM  Problem List: pain affecting function, decrease ROM, decrease strength, edema affecting function, impaired gait/ balance, decrease ADL/ functional abilitiies, decrease activity tolerance, decrease flexibility/ joint mobility, and decrease transfer abilities   Treatment Plan may include any combination of the following: Therapeutic exercise, Neuromuscular reeducation, Manual therapy, Therapeutic activity, Self care/home management, Electric stim unattended , Vasopneumatic device, Gait training, Ultrasound, Mechanical traction, and Electric stim attended  Patient / Family readiness to learn indicated by: interest  Persons(s) to be included in education: patient (P)  Barriers to Learning/Limitations: None  Measures taken:    Patient Goal (s): Normal walking/routine   Patient self reported health status: good  Rehabilitation Potential: good    Short term goals to be accomplished in 2 visits: The patient will demonstrate safety with ambulation in clinic using LRAD. Frequency / Duration:   Patient to be seen  1  times per week for 2  treatments. Patient / Caregiver education and instruction: self care, activity modification, and exercises  Therapist Signature: Hung Saenz PT Date: 6/5/1725   Certification Period: N/A Time: 12:05 PM   ===========================================================================================  I certify that the above Physical Therapy Services are being furnished while the patient is under my care. I agree with the treatment plan and certify that this therapy is necessary. Physician Signature:        Date:       Time:     Please sign and return to [facility] or you may fax the signed copy to [phone]. Thank you.   Arslan Garrett MD

## 2023-02-08 NOTE — PROGRESS NOTES
PT DAILY TREATMENT NOTE     Patient Name: Jenny Michael Old  Date:2023  : 1965  [x]  Patient  Verified  Payor: BLUE CROSS / Plan: [facility] / Product Type: PPO /    In time:10:08  Out time:10:48  Total Treatment Time (min): 40  Visit #: 1 of 2    Medicare/BCBS Only   Total Timed Codes (min):  25 1:1 Treatment Time:  40       Treatment Area: Posterior tibial tendinitis, left leg [M76.822]    SUBJECTIVE  Pain Level (0-10 scale): 3/10   Any medication changes, allergies to medications, adverse drug reactions, diagnosis change, or new procedure performed?: [x] No    [] Yes (see summary sheet for update)  Subjective functional status/changes:   [] No changes reported  The patient for pre op visit with c/o left ankle pain. She is scheduled for posterior tibialis tendon repair on 02/15/2023. She presents with axillary crutches and knee scooter, states she has FWW at home. Her bedroom and bathroom are on the second floor, states she has elevated toilet seat with grab bars, tub shower. She has about 4 GIOVANNA home with handrails on the right. The patient will be receiving assistance from  for transfers and self care post op.      Will assess FOTO at first post op visit     OBJECTIVE    Modality rationale: decrease inflammation and decrease pain to improve the patients ability to ambulate with LRAD   Min Type Additional Details    [] Estim:  []Unatt       []IFC  []Premod                        []Other:  []w/ice   []w/heat  Position:  Location:    [] Estim: []Att    []TENS instruct  []NMES                    []Other:  []w/US   []w/ice   []w/heat  Position:  Location:    []  Traction: [] Cervical       []Lumbar                       [] Prone          []Supine                       []Intermittent   []Continuous Lbs:  [] before manual  [] after manual    []  Ultrasound: []Continuous   [] Pulsed                           []1MHz   []3MHz W/cm2:  Location:    []  Iontophoresis with dexamethasone         Location: [] Take home patch   [] In clinic    []  Ice     []  heat  []  Ice massage  []  Laser   []  Anodyne Position:  Location:    []  Laser with stim  []  Other:  Position:  Location:    []  Vasopneumatic Device  Pre-treatment girth:   Post-treatment girth:   Measured at landmark location:  Pressure:       [] lo [] med [] hi   Temperature: [] lo [] med [] hi   [] Skin assessment post-treatment:  []intact []redness- no adverse reaction    []redness - adverse reaction:   Vasopnuematic compression justification:  Per bilateral girth measures taken and listed above the edema is considered significant and having an impact on the patient's strength, balance, and gait    15 min [x]Eval                  []Re-Eval       13 min Therapeutic Exercise:  [] See flow sheet :  PATIENT EDUCATION: PATHOLOGY OF SYMPTOMS, ANATOMY OF POSTERIOR TIBIALIS, POST SURGICAL PLAN   Rationale: increase ROM, increase strength, improve balance, and increase proprioception to improve the patients ability to perform transfers     x min Therapeutic Activity:  []  See flow sheet :   Rationale: increase ROM, increase strength, improve balance, and increase proprioception  to improve the patients ability to ambulate stairs      x min Neuromuscular Re-education:  []  See flow sheet :   Rationale: increase ROM, increase strength, improve balance, and increase proprioception  to improve the patients gait stability     x min Manual Therapy: The manual therapy interventions were performed at a separate and distinct time from the therapeutic activities interventions. Rationale: decrease pain, increase ROM, and increase tissue extensibility to improve her gait mechanics     15 min Gait Training:    Fit patient for B/L axillary crutches   Instruction on ambulation with B/L axillary crutches NWB on LLE       Rationale:           With   [] TE   [] TA   [] neuro   [] other: Patient Education: [x] Review HEP    [] Progressed/Changed HEP based on:   [] positioning   [] body mechanics   [] transfers   [] heat/ice application    [] other:      Other Objective/Functional Measures:    Patient presents with brace on left ankle - no gait deviations present     Fall Risk Assessment: Does the patient have a fear of falling? YES If yes, what fall risk assessment was performed? Fit patient for B/L axillary crutches   Instructed patient on step through pattern using axillary crutches   Attempted therapy stairs using B/L crutches - patient expressed increased apprehension due to stability and increased pain at right hip   Assessed knee scooter     Pain Level (0-10 scale) post treatment: 3/10    ASSESSMENT/Changes in Function:   The patient presents for evaluation for pre-op instruction prior to sx on 02/15/2023 for left posterior tibialis. The patient presents today with brace on left ankle, no gait deviations present. Fit patient for B/L axillary crutches and instructed on step-through gait pattern with precautions as she will be NWB post op. Attempted therapy stairs using B/L crutches however the patient expressed increased apprehension due to stability and increased pain at right hip. Educated patient and her  on safety with wheelchair, FWW and B/L axillary crutch safety with transfers and stairs- provided educational printouts. The patient will benefit from skilled PT to assess post-op limitations and improve functional mobility. Patient will continue to benefit from skilled PT services to modify and progress therapeutic interventions, address functional mobility deficits, address ROM deficits, address strength deficits, analyze and address soft tissue restrictions, analyze and cue movement patterns, analyze and modify body mechanics/ergonomics, assess and modify postural abnormalities, and instruct in home and community integration to attain remaining goals.      [x]  See Plan of Care  []  See progress note/recertification  []  See Discharge Summary         Progress towards goals / Updated goals:  Short term goals to be accomplished in 2 visits: The patient will demonstrate safety with ambulation in clinic using LRAD. PLAN  []  Upgrade activities as tolerated     []  Continue plan of care  []  Update interventions per flow sheet       []  Discharge due to:_  [x]  Other: The patient will benefit from skilled PT for 2 visits to assess post op limitations and improve functional mobility     Justification for Eval Code Complexity:  Patient History : hx of skilled PT treatment for posterior tib tendonitis 2022  Examination see exam   Clinical Presentation: evolving with changing characteristics   Clinical Decision Making : FOTO : WILL ASSESS AT GRISELDA Gibson 2/8/2023  10:07 AM    No future appointments.

## 2023-05-08 ENCOUNTER — HOSPITAL ENCOUNTER (OUTPATIENT)
Facility: HOSPITAL | Age: 58
Setting detail: RECURRING SERIES
Discharge: HOME | End: 2023-05-11
Payer: COMMERCIAL

## 2023-05-08 PROCEDURE — 97162 PT EVAL MOD COMPLEX 30 MIN: CPT

## 2023-05-08 PROCEDURE — 97110 THERAPEUTIC EXERCISES: CPT

## 2023-05-08 NOTE — PROGRESS NOTES
PHYSICAL / OCCUPATIONAL THERAPY - DAILY TREATMENT NOTE (updated )  For Eval visit    Patient Name: Boom Lanier Old    Date: 2023    : 1965  Insurance: Payor: Tawana Pascual / Plan: Yossi Villalpando / Product Type: *No Product type* /      Patient  verified yes     Visit #   Current / Total 1 12   Time   In / Out 10:26 11:00   Total Treatment  Time  34   Pain   In / Out 0/10 0/10   Subjective Functional Status/Changes: See below   Changes to:  Meds, Allergies, Med Hx, Sx Hx? If yes, update Summary List no       TREATMENT AREA =  Posterior tibial tendinitis, left leg [M76.822]    SUBJECTIVE:  The patient presents with c/o left ankle pain s/p posterior tibialis repair, flexor digitorum longus transfer, medial calcaneal osteotomy 02/15/2023. She states she was told by MD to transition to walking with brace with sneaker as able, WBAT per patient report. Patient is taking ibuprofen as needed. Functional Limitations:  Prolonged WB through LLE with CAM boot, poor walking tolerance with CAM boot and FWW, difficulty with stairs,     Max pain: 3/10  Average: 1/10  Min pain: 0/10      FOTO:  30/100    Past Medical Hx:  s/p posterior tibialis repair, flexor digitorum longus transfer, medial calcaneal osteotomy 02/15/2023      OBJECTIVE    Modalities Rationale:     decrease edema, decrease inflammation, and decrease pain to improve patient's ability to progress to PLOF and address remaining functional goals.      min [] Estim Unattended, type/location:                                      []  w/ice    []  w/heat    min [] Estim Attended, type/location:                                     []  w/US     []  w/ice    []  w/heat    []  TENS insruct      min []  Mechanical Traction: type/lbs                   []  pro   []  sup   []  int   []  cont    []  before manual    []  after manual    min []  Ultrasound, settings/location:      min []  Iontophoresis w/ dexamethasone, location:
visits: The pt will be IND and compliant with HEP and self management of symptoms. Status at eval: initiated program   Current:    The patient will perform good quad set in long sitting position to prepare for full WB phase of rehab. Status at eval: minimal quad set   Current:          Long term goals to be accomplished in 12 visits: The patient will improve left ankle AROM DF to 0 deg to improve her gait mechanics. Status at eval: -20 deg   Current:    The patient will present with left ankle strength of 4/5 globally for improved walking tolerance. Status at eval: not tested   Current:    The patient will ambulate 100 feet with LRAD and FWB through LLE for improved household ambulation. Status at eval: ambulating with knee scooter, limited walking tolerance and WB tolerance through LLE  Current:    The patient will improve FOTO score to 56/100 as a functional indicator of improved mobility. Status at eval: 30/100  Current:      Frequency / Duration: Patient would benefit from skilled PT 2 times per week for up to 12 sessions as needed in this certification period. Goals will be assigned and reassessed every 10 visits/ 30 days per guidelines . Patient/ Caregiver education and instruction: Diagnosis, prognosis, exercises [x]  Plan of care has been reviewed with SHASHA Dimas, PT       5/8/2023       12:25 PM  ===================================================================  I certify that the above Therapy Services are being furnished while the patient is under my care. I agree with the treatment plan and certify that this therapy is necessary. [de-identified] Signature:_________________________   DATE:_________   TIME:________                           Bowen Vasquez MD    ** Signature, Date and Time must be completed for valid certification **  Please sign and [facility] or you may fax the signed copy to [phone]. Thank you.

## 2023-05-10 ENCOUNTER — HOSPITAL ENCOUNTER (OUTPATIENT)
Facility: HOSPITAL | Age: 58
Setting detail: RECURRING SERIES
Discharge: HOME | End: 2023-05-13
Payer: COMMERCIAL

## 2023-05-10 PROCEDURE — 97140 MANUAL THERAPY 1/> REGIONS: CPT | Performed by: PHYSICAL THERAPIST

## 2023-05-10 PROCEDURE — 97035 APP MDLTY 1+ULTRASOUND EA 15: CPT | Performed by: PHYSICAL THERAPIST

## 2023-05-10 PROCEDURE — 97116 GAIT TRAINING THERAPY: CPT | Performed by: PHYSICAL THERAPIST

## 2023-05-10 NOTE — PROGRESS NOTES
[phone] Therapeutic Exercise (timed):  increase ROM, strength, coordination, balance, and proprioception to improve patient's ability to progress to PLOF and address remaining functional goals. (see flow sheet as applicable)     Details if applicable:    Bike  LAQ     10559 Therapeutic Activity (timed):  use of dynamic activities replicating functional movements to increase ROM, strength, coordination, balance, and proprioception in order to improve patient's ability to progress to PLOF and address remaining functional goals. (see flow sheet as applicable)     Details if applicable:       69680 Neuromuscular Re-Education (timed):  improve balance, coordination, kinesthetic sense, posture, core stability and proprioception to improve patient's ability to develop conscious control of individual muscles and awareness of position of extremities in order to progress to PLOF and address remaining functional goals. (see flow sheet as applicable)     Details if applicable:     24 24 [id number] Gait Training (timed):    Standing weight shifts  Step ups  Attempted ambulation with SPC and FWW    Details if applicable:     [phone] Manual Therapy (timed):  decrease pain to improve patient's ability to progress to PLOF and address remaining functional goals. The manual therapy interventions were performed at a separate and distinct time from the therapeutic activities interventions .  (see flow sheet as applicable)     Details if applicable:  K-tape to the L knee for patellar stability 75% to reduce pain in the L knee with ambulation without AD, pt educated on wear protocol   44 39 [facility] Totals Reminder: bill using total billable min of TIMED therapeutic procedures (example: do not include dry needle or estim unattended, both untimed codes, in totals to left)  8-22 min = 1 unit; 23-37 min = 2 units; 38-52 min = 3 units; 53-67 min = 4 units; 68-82 min = 5 units   Total Total     [x]  Patient Education billed concurrently with

## 2023-05-11 ENCOUNTER — APPOINTMENT (OUTPATIENT)
Facility: HOSPITAL | Age: 58
End: 2023-05-11
Payer: COMMERCIAL

## 2023-05-16 ENCOUNTER — APPOINTMENT (OUTPATIENT)
Facility: HOSPITAL | Age: 58
End: 2023-05-16
Payer: COMMERCIAL

## 2023-05-16 ENCOUNTER — HOSPITAL ENCOUNTER (OUTPATIENT)
Facility: HOSPITAL | Age: 58
Setting detail: RECURRING SERIES
Discharge: HOME | End: 2023-05-19
Payer: COMMERCIAL

## 2023-05-16 PROCEDURE — 97116 GAIT TRAINING THERAPY: CPT

## 2023-05-16 PROCEDURE — 97016 VASOPNEUMATIC DEVICE THERAPY: CPT

## 2023-05-16 PROCEDURE — 97140 MANUAL THERAPY 1/> REGIONS: CPT

## 2023-05-16 PROCEDURE — 97110 THERAPEUTIC EXERCISES: CPT

## 2023-05-16 NOTE — PROGRESS NOTES
dry needle or estim unattended, both untimed codes, in totals to left)  8-22 min = 1 unit; 23-37 min = 2 units; 38-52 min = 3 units; 53-67 min = 4 units; 68-82 min = 5 units   Total Total     [x]  Patient Education billed concurrently with other procedures   [x] Review HEP    [] Progressed/Changed HEP, detail:    [] Other detail:       Objective Information/Functional Measures/Assessment:  Patient presents ambulating with FWW and CAM boot, demonstrates mild antalgic gait   Continued to educate patient on benefit of using AD with ambulation and educated on pathology of sx   Patient demonstrates improved quad set since IE   Increased challenge with WB quad set   Applied vasopneumatic device for pain management, assess response nv     Patient will continue to benefit from skilled PT / OT services to modify and progress therapeutic interventions, analyze and address functional mobility deficits, analyze and address ROM deficits, analyze and address strength deficits, analyze and address soft tissue restrictions, and analyze and address imbalance/dizziness to address functional deficits and attain remaining goals. Progress toward goals / Updated goals:  []  See Progress Note/Recertification  Short term goals to be accomplished in 4 visits: The pt will be IND and compliant with HEP and self management of symptoms. Status at eval: initiated program   Current:    The patient will perform good quad set in long sitting position to prepare for full WB phase of rehab. Status at eval: minimal quad set   Current:  improving quad set 05/16/2023        Long term goals to be accomplished in 12 visits: The patient will improve left ankle AROM DF to 0 deg to improve her gait mechanics. Status at eval: -20 deg   Current:    The patient will present with left ankle strength of 4/5 globally for improved walking tolerance.    Status at eval: not tested   Current:    The patient will ambulate 100 feet with LRAD and FWB through

## 2023-05-17 ENCOUNTER — HOSPITAL ENCOUNTER (OUTPATIENT)
Facility: HOSPITAL | Age: 58
Setting detail: RECURRING SERIES
Discharge: HOME | End: 2023-05-20
Payer: COMMERCIAL

## 2023-05-17 PROCEDURE — 97110 THERAPEUTIC EXERCISES: CPT

## 2023-05-17 PROCEDURE — 97016 VASOPNEUMATIC DEVICE THERAPY: CPT

## 2023-05-17 PROCEDURE — 97116 GAIT TRAINING THERAPY: CPT

## 2023-05-17 PROCEDURE — 97140 MANUAL THERAPY 1/> REGIONS: CPT

## 2023-05-17 NOTE — PROGRESS NOTES
PHYSICAL / OCCUPATIONAL THERAPY - DAILY TREATMENT NOTE (updated )    Patient Name: Judith Fisher Old    Date: 2023    : 1965  Insurance: Payor: Oanh Velasco / Plan: Ramakrishna Avitia / Product Type: *No Product type* /      Patient  verified yes     Visit #   Current / Total 4 12   Time   In / Out 10:20  11:13     Total Treatment Time 53    Pain   In / Out 0/10 0/10    Subjective Functional Status/Changes: Patient reports vaso felt good after last time. Changes to:  Meds, Allergies, Med Hx, Sx Hx? If yes, update Summary List no       TREATMENT AREA =  Posterior tibial tendinitis, left leg [M76.822]    OBJECTIVE    Modalities Rationale:     decrease edema and decrease pain to improve patient's ability to progress to PLOF and address remaining functional goals. min [] Estim Unattended, type/location:                                      []  w/ice    []  w/heat    min [] Estim Attended, type/location:                                     []  w/US     []  w/ice    []  w/heat    []  TENS insruct      min []  Mechanical Traction: type/lbs                   []  pro   []  sup   []  int   []  cont    []  before manual    []  after manual   x  min [x]  Ultrasound, settings/location:  L medial knee pulsed    min []  Iontophoresis w/ dexamethasone, location:                                               []  take home patch       []  in clinic    min  unbilled []  Ice     []  Heat    location/position:     min []  Paraffin,  details:    10  min [x]  Vasopneumatic Device, press/temp:     min []  Quinten Pham / Dipesh Barclay: If using vaso (only need to measure limb vaso being performed on)      pre-treatment girth : 49 cm      post-treatment girth : 49  cm       measured at (landmark location) :  figure 8     min []  Other:    Skin assessment post-treatment (if applicable):    []  intact    []  redness- no adverse reaction                 []redness - adverse reaction:          Therapeutic Procedures:   Tx Min Billable or 1:1

## 2023-05-18 ENCOUNTER — APPOINTMENT (OUTPATIENT)
Facility: HOSPITAL | Age: 58
End: 2023-05-18
Payer: COMMERCIAL

## 2023-05-24 ENCOUNTER — HOSPITAL ENCOUNTER (OUTPATIENT)
Facility: HOSPITAL | Age: 58
Setting detail: RECURRING SERIES
Discharge: HOME | End: 2023-05-27
Payer: COMMERCIAL

## 2023-05-24 PROCEDURE — 97110 THERAPEUTIC EXERCISES: CPT

## 2023-05-24 PROCEDURE — 97140 MANUAL THERAPY 1/> REGIONS: CPT

## 2023-05-24 PROCEDURE — 97116 GAIT TRAINING THERAPY: CPT

## 2023-05-24 NOTE — PROGRESS NOTES
dynamic movement with household/community ambulation. (see flow sheet as applicable)     Details if applicable:    Standing weight shifts  Pre gait   Gait mechanics      TC: Step ups  Attempted ambulation with SPC and FWW   52 40 [facility] Totals Reminder: bill using total billable min of TIMED therapeutic procedures (example: do not include dry needle or estim unattended, both untimed codes, in totals to left)  8-22 min = 1 unit; 23-37 min = 2 units; 38-52 min = 3 units; 53-67 min = 4 units; 68-82 min = 5 units   Total Total     [x]  Patient Education billed concurrently with other procedures   [x] Review HEP    [] Progressed/Changed HEP, detail:    [] Other detail:       Objective Information/Functional Measures/Assessment:  -Patient reports compliance with HEP  -Today she demonstrates grossly 2 deg PROM Left ankle dorsiflexion during manual interventions  -Overall Patient notes limited pain in her Left foot, but increased pain in her Left knee; she states that she noticed pain begin last night during standing weight shifts as part of HEP.  Pain may be related to being inactive for 11 weeks post-op in cast and on scooter, with resulting weakness in quads and hips (improving quad set with standing activities)  -She feels frustrated with inability to walk due to difficulty with weight bearing on the Left without walker; advised Patient to bring sneaker and brace to next session to begin working on gait with assistance and parallel bars   -When Patient ambulates without walker in clinic today for assessment she demonstrates limited Left stance phase, increased arm/trunk compensation and while she reports limited pain, but states it is challenging     Patient will continue to benefit from skilled PT / OT services to modify and progress therapeutic interventions, analyze and address functional mobility deficits, analyze and address ROM deficits, analyze and address strength deficits, analyze and address soft tissue

## 2023-05-25 ENCOUNTER — HOSPITAL ENCOUNTER (OUTPATIENT)
Facility: HOSPITAL | Age: 58
Setting detail: RECURRING SERIES
Discharge: HOME | End: 2023-05-28
Payer: COMMERCIAL

## 2023-05-25 PROCEDURE — 97116 GAIT TRAINING THERAPY: CPT

## 2023-05-25 PROCEDURE — 97016 VASOPNEUMATIC DEVICE THERAPY: CPT

## 2023-05-25 PROCEDURE — 97140 MANUAL THERAPY 1/> REGIONS: CPT

## 2023-05-31 ENCOUNTER — HOSPITAL ENCOUNTER (OUTPATIENT)
Facility: HOSPITAL | Age: 58
Setting detail: RECURRING SERIES
Discharge: HOME | End: 2023-06-03
Payer: COMMERCIAL

## 2023-05-31 PROCEDURE — 97116 GAIT TRAINING THERAPY: CPT

## 2023-05-31 PROCEDURE — 97016 VASOPNEUMATIC DEVICE THERAPY: CPT

## 2023-05-31 PROCEDURE — 97140 MANUAL THERAPY 1/> REGIONS: CPT

## 2023-05-31 PROCEDURE — 97112 NEUROMUSCULAR REEDUCATION: CPT

## 2023-05-31 NOTE — PROGRESS NOTES
PHYSICAL / OCCUPATIONAL THERAPY - DAILY TREATMENT NOTE (updated )    Patient Name: Chance Rowe Old    Date: 2023    : 1965  Insurance: Payor: Herson Landon / Plan: Karla Arias / Product Type: *No Product type* /      Patient  verified yes     Visit #   Current / Total 7 12   Time   In / Out 10:23 11:20    Total Treatment Time 57    Pain   In / Out 0/10 at rest, 4-5/10 active 0/10    Subjective Functional Status/Changes: The patient reports she is having more soreness in her foot when walking without the boot, but pain levels are controllable. Changes to:  Meds, Allergies, Med Hx, Sx Hx? If yes, update Summary List no       TREATMENT AREA =  Posterior tibial tendinitis, left leg [M76.822]    OBJECTIVE    Modalities Rationale:     decrease edema, decrease inflammation, and decrease pain to improve patient's ability to progress to PLOF and address remaining functional goals. min [] Estim Unattended, type/location:                                      []  w/ice    []  w/heat    min [] Estim Attended, type/location:                                     []  w/US     []  w/ice    []  w/heat    []  TENS insruct      min []  Mechanical Traction: type/lbs                   []  pro   []  sup   []  int   []  cont    []  before manual    []  after manual    min []  Ultrasound, settings/location:      min []  Iontophoresis w/ dexamethasone, location:                                               []  take home patch       []  in clinic    min  unbilled []  Ice     []  Heat    location/position:     min []  Paraffin,  details:    15  min [x]  Vasopneumatic Device, press/temp:  LT/MP     min []  Micah Cool / Monica Ode:     If using vaso (only need to measure limb vaso being performed on)      pre-treatment girth : 49 cm      post-treatment girth : 49 cm      measured at (landmark location) :  figure 8    min []  Other:    Skin assessment post-treatment (if applicable):    [x]  intact    [x]  redness- no adverse reaction

## 2023-06-02 ENCOUNTER — HOSPITAL ENCOUNTER (OUTPATIENT)
Facility: HOSPITAL | Age: 58
Setting detail: RECURRING SERIES
Discharge: HOME | End: 2023-06-05
Payer: COMMERCIAL

## 2023-06-02 PROCEDURE — 97140 MANUAL THERAPY 1/> REGIONS: CPT

## 2023-06-02 PROCEDURE — 97530 THERAPEUTIC ACTIVITIES: CPT

## 2023-06-02 PROCEDURE — 97110 THERAPEUTIC EXERCISES: CPT

## 2023-06-02 PROCEDURE — 97016 VASOPNEUMATIC DEVICE THERAPY: CPT

## 2023-06-07 ENCOUNTER — HOSPITAL ENCOUNTER (OUTPATIENT)
Facility: HOSPITAL | Age: 58
Setting detail: RECURRING SERIES
Discharge: HOME | End: 2023-06-10
Payer: COMMERCIAL

## 2023-06-07 PROCEDURE — 97110 THERAPEUTIC EXERCISES: CPT

## 2023-06-07 PROCEDURE — 97016 VASOPNEUMATIC DEVICE THERAPY: CPT

## 2023-06-07 NOTE — PROGRESS NOTES
[facility] PHYSICAL THERAPY  [address] [facility] Susan Alvarez 68  Phone: [phone]   [phone]  PHYSICAL THERAPY PROGRESS NOTE  Patient Name: Maritza Mercer : 1965   Treatment/Medical Diagnosis: Posterior tibial tendinitis, left leg [M76.822]   Referral Source: Denise Holman MD     Date of Initial Visit: 2023 Attended Visits: 9 Missed Visits: 0     SUMMARY OF TREATMENT  The pt has been seen for 9 visits to address left ankle pain s/p posterior tibialis repair, flexor digitorum longus transfer, medial calcaneal osteotomy 02/15/2023. Therapeutic interventions have included therapeutic exercise, therapeutic activity, neuromuscular re-education, manual treatment, modalities and patient education. CURRENT STATUS  Patient states she was cleared by MD to return to swimming as tolerated.       % improvement: 50%  Max pain 4-5/10 when weight bearing without support   Avg pain 0/10  Min pain 0/10     Current improvements: improved activity tolerance, able to walk without boot using walker, improving knee pain, improving rolling in bed, good compliance with HEP   Remaining functional limitations: mobility in left ankle, walking tolerance, unable to walk without walker, decreased endurance and prolonged ADL tolerance, weakness in the LLE and foot     FOTO:         Objective Information/Functional Measures/Assessment:  Patient presents for reassessment following 9 visits to address left ankle pain s/p posterior tibialis repair, flexor digitorum longus transfer, medial calcaneal osteotomy 02/15/2023   Gait: ambulating with brace and sneaker using FWW, demonstrates shortened step length and limited heel strike and toe off   Ambulation with FWW: 42 feet with FWW in 1 min 30 seconds pain increase 2/10  Quad set: full quad set for 5 seconds without pain  Left ankle AROM: DF -10 deg, INV 15 deg, EVR 5 deg   Left ankle strength: DF 5/5, INV 4+/5, EVR
tolerance through LLE  Current:  42 feet with FWW in 1 min 30 seconds pain increase 2/10 06/07/2023  The patient will improve FOTO score to 56/100 as a functional indicator of improved mobility.     Status at eval: 30/100  Current:  26/100 06/07/2023    PLAN  [x]  Continue plan of care  []  Upgrade activities as tolerated  []  Discharge due to :  [x]  Other: Patient will benefit from continued skilled PT 2x/week for 12 visits     Diomedes Santos PT    6/7/2023    7:06 AM    Future Appointments   Date Time Provider Carin Fallon   6/7/2023  9:40 AM Aline Ryan, PT [facility]   6/9/2023  9:40 AM Diomedes Santos, PT [facility]   6/14/2023  9:40 AM Diomedes Santos, [address] [facility]   6/16/2023  9:40 AM [facility] [address] [facility]   6/19/2023  9:40 AM Diomedes Santos, PT [facility]   6/21/2023  9:00 AM Diomedes Santos, [address] [facility]   6/28/2023  9:00 AM Aline Ryan, [address] [facility]   6/30/2023  9:00 AM Aline Ryan, PT [facility]   7/5/2023  9:40 AM Aline Ryan, PT [facility]   7/7/2023  9:40 AM [facility] PT [place] [address] [facility]   7/12/2023  9:40 AM [facility]   7/14/2023  9:40 AM Aline Ryan, PT [facility]   7/19/2023  9:40 AM Aline Ryan, [address] [facility]   7/21/2023  9:40 AM [facility]   7/26/2023  9:40 AM Diomedes Santos, PT [facility]   7/28/2023  9:40 AM Aline Ryan, PT [facility]

## 2023-06-16 ENCOUNTER — HOSPITAL ENCOUNTER (OUTPATIENT)
Facility: HOSPITAL | Age: 58
Setting detail: RECURRING SERIES
Discharge: HOME | End: 2023-06-19
Payer: COMMERCIAL

## 2023-06-16 PROCEDURE — 97110 THERAPEUTIC EXERCISES: CPT

## 2023-06-16 PROCEDURE — 97140 MANUAL THERAPY 1/> REGIONS: CPT

## 2023-06-16 PROCEDURE — 97530 THERAPEUTIC ACTIVITIES: CPT

## 2023-06-19 ENCOUNTER — HOSPITAL ENCOUNTER (OUTPATIENT)
Facility: HOSPITAL | Age: 58
Setting detail: RECURRING SERIES
Discharge: HOME | End: 2023-06-22
Payer: COMMERCIAL

## 2023-06-19 PROCEDURE — 97116 GAIT TRAINING THERAPY: CPT

## 2023-06-19 PROCEDURE — 97140 MANUAL THERAPY 1/> REGIONS: CPT

## 2023-06-19 PROCEDURE — 97530 THERAPEUTIC ACTIVITIES: CPT

## 2023-06-19 PROCEDURE — 97016 VASOPNEUMATIC DEVICE THERAPY: CPT

## 2023-06-19 NOTE — PROGRESS NOTES
PHYSICAL / OCCUPATIONAL THERAPY - DAILY TREATMENT NOTE (updated )    Patient Name: Yohana Cordova Old    Date: 2023    : 1965  Insurance: Payor: Sofia Lau / Plan: Creig Fling / Product Type: *No Product type* /      Patient  verified yes     Visit #   Current / Total 4 12   Time   In / Out 940 10:39   Total Treatment Time 59     0 0   Subjective Functional Status/Changes: Patient reports she feels good walking in the pool. She states KT tap is still on and her knee feels better. She repots daily compliance with HEP. Patient states she bought [employer] for home use, has not attempted yet. Changes to:  Meds, Allergies, Med Hx, Sx Hx? If yes, update Summary List no      TREATMENT AREA =  Posterior tibial tendinitis, left leg [M76.822]    OBJECTIVE    Modalities Rationale:     decrease edema, decrease inflammation, and decrease pain to improve patient's ability to progress to PLOF and address remaining functional goals. min [] Estim Unattended, type/location:                                      []  w/ice    []  w/heat    min [] Estim Attended, type/location:                                     []  w/US     []  w/ice    []  w/heat    []  TENS insruct      min []  Mechanical Traction: type/lbs                   []  pro   []  sup   []  int   []  cont    []  before manual    []  after manual    min []  Ultrasound, settings/location:      min []  Iontophoresis w/ dexamethasone, location:                                               []  take home patch       []  in clinic    min  unbilled []  Ice     []  Heat    location/position:     min []  Paraffin,  details:    10    min [x]  Vasopneumatic Device, press/temp:  LT/MP     min []  Elizebeth Shone / Yossi Angry:     If using vaso (only need to measure limb vaso being performed on)      pre-treatment girth : 48.5 cm      post-treatment girth : 48 cm      measured at (landmark location) :  figure 8    min []  Other:    Skin assessment post-treatment (if applicable):    [x]

## 2023-06-21 ENCOUNTER — HOSPITAL ENCOUNTER (OUTPATIENT)
Facility: HOSPITAL | Age: 58
Setting detail: RECURRING SERIES
Discharge: HOME | End: 2023-06-24
Payer: COMMERCIAL

## 2023-06-21 PROCEDURE — 97110 THERAPEUTIC EXERCISES: CPT | Performed by: PHYSICAL THERAPIST

## 2023-06-21 PROCEDURE — 97116 GAIT TRAINING THERAPY: CPT | Performed by: PHYSICAL THERAPIST

## 2023-06-21 PROCEDURE — 97140 MANUAL THERAPY 1/> REGIONS: CPT | Performed by: PHYSICAL THERAPIST

## 2023-06-21 PROCEDURE — 97530 THERAPEUTIC ACTIVITIES: CPT | Performed by: PHYSICAL THERAPIST

## 2023-06-21 PROCEDURE — 97016 VASOPNEUMATIC DEVICE THERAPY: CPT | Performed by: PHYSICAL THERAPIST

## 2023-06-21 NOTE — PROGRESS NOTES
PHYSICAL / OCCUPATIONAL THERAPY - DAILY TREATMENT NOTE (updated )    Patient Name: Clementine Mercer    Date: 2023    : 1965  Insurance: Payor: Isaiah Townsend 150 / Plan: Dora Fisher / Product Type: *No Product type* /      Patient  verified yes     Visit #   Current / Total 5 12   Time   In / Out 900 1018   Total Treatment Time 78   Pain   In / Out 0/10 0/10   Subjective Functional Status/Changes: Pt states that she is tired today due to being busy yesterday with her surgeon doctor appt, grocery shopping with walker with lots of walking/standing, and housechores. She was told by her doctor that it was up to us when she could take her ankle brace. She states that she has not had ankle pain or a lot of swelling. Pt's next appt with surgeon in .      TREATMENT AREA =  Posterior tibial tendinitis, left leg [M76.822]    OBJECTIVE    Modalities Rationale:     decrease edema, decrease inflammation, and decrease pain to improve patient's ability to progress to PLOF and address remaining functional goals. min [] Estim Unattended, type/location:                                      []  w/ice    []  w/heat    min [] Estim Attended, type/location:                                     []  w/US     []  w/ice    []  w/heat    []  TENS insruct      min []  Mechanical Traction: type/lbs                   []  pro   []  sup   []  int   []  cont    []  before manual    []  after manual    min []  Ultrasound, settings/location:      min []  Iontophoresis w/ dexamethasone, location:                                               []  take home patch       []  in clinic    min  unbilled []  Ice     []  Heat    location/position:     min []  Paraffin,  details:    15 + 3 set up min [x]  Vasopneumatic Device, press/temp: LT/MP around L ankle/foot    min []  Jaden Shouts / Denise Streeter:     If using vaso (only need to measure limb vaso being performed on)      pre-treatment girth : 48 cm      post-treatment girth : 48 cm      measured

## 2023-06-28 ENCOUNTER — HOSPITAL ENCOUNTER (OUTPATIENT)
Facility: HOSPITAL | Age: 58
Setting detail: RECURRING SERIES
Discharge: HOME | End: 2023-07-01
Payer: COMMERCIAL

## 2023-06-28 PROCEDURE — 97116 GAIT TRAINING THERAPY: CPT

## 2023-06-28 PROCEDURE — 97530 THERAPEUTIC ACTIVITIES: CPT

## 2023-06-28 PROCEDURE — 97140 MANUAL THERAPY 1/> REGIONS: CPT

## 2023-06-28 PROCEDURE — 97016 VASOPNEUMATIC DEVICE THERAPY: CPT

## 2023-06-30 ENCOUNTER — HOSPITAL ENCOUNTER (OUTPATIENT)
Facility: HOSPITAL | Age: 58
Setting detail: RECURRING SERIES
End: 2023-06-30
Payer: COMMERCIAL

## 2023-06-30 PROCEDURE — 97016 VASOPNEUMATIC DEVICE THERAPY: CPT

## 2023-06-30 PROCEDURE — 97530 THERAPEUTIC ACTIVITIES: CPT

## 2023-06-30 PROCEDURE — 97140 MANUAL THERAPY 1/> REGIONS: CPT

## 2023-06-30 PROCEDURE — 97116 GAIT TRAINING THERAPY: CPT

## 2023-07-05 ENCOUNTER — HOSPITAL ENCOUNTER (OUTPATIENT)
Facility: HOSPITAL | Age: 58
Setting detail: RECURRING SERIES
Discharge: HOME | End: 2023-07-08
Payer: COMMERCIAL

## 2023-07-05 PROCEDURE — 97530 THERAPEUTIC ACTIVITIES: CPT

## 2023-07-05 PROCEDURE — 97016 VASOPNEUMATIC DEVICE THERAPY: CPT

## 2023-07-05 PROCEDURE — 97110 THERAPEUTIC EXERCISES: CPT

## 2023-07-05 PROCEDURE — 97140 MANUAL THERAPY 1/> REGIONS: CPT

## 2023-07-05 NOTE — PROGRESS NOTES
her gait stability. Status at last assessment (07/05/2023):  SLS unable, tandem unable, 3/4 tandem 30 seconds  Current:    The patient will ambulate therapy stairs with reciprocal pattern and good eccentric quad control to improve her home stair tolerance. Status at last assessment (0/705/2023):  ambulates therapy stairs with reciprocal pattern ascending and descending with BUE support and pain at left ankle 2/10 with descending   Current:    The patient will improve FOTO score to 56/100 as a functional indicator of improved mobility. Status at last assessment (07/05/2023):  45/100   Current:      Payor: Mu Pace / Plan: [facility] / Product Type: *No Product type* /     Frequency / Duration:   Patient to be seen   2   times per week for   10    treatments:    RECOMMENDATIONS  We would like to continue therapy for progress to goals stated above. Continue therapy with changes to Plan of Care to include: updated therapy goals     If you have any questions/comments please contact us directly. Thank you for allowing us to assist in the care of your patient.     Curt Murry, PT       7/5/2023       10:35 AM
FOTO score to 56/100 as a functional indicator of improved mobility.     Status at last assessment (06/07/2023): 26/100  Current:  45/100  07/05/2023       PLAN  [x]  Continue plan of care  [x]  Upgrade activities as tolerated  []  Discharge due to :  [x]  Other: patient will benefit from continued skilled PT 2x/week for 10 visits      Curt Murry, PT    7/5/2023    9:49 AM    Future Appointments   Date Time Provider [address]  [facility]   7/7/2023  9:40 AM [address] [facility]   7/12/2023  9:40 AM Aline Daniels, PT [facility]   7/14/2023  9:40 AM Curt Murry, PT [facility]   7/19/2023  9:40 AM Curt Murry, PT [facility]   7/21/2023  9:40 AM [facility] PT [place] [address] [facility]   7/26/2023  9:40 AM Curt Murry, PT [facility]   7/28/2023  9:40 AM Aline Daniels, PT [facility]

## 2023-07-07 ENCOUNTER — HOSPITAL ENCOUNTER (OUTPATIENT)
Facility: HOSPITAL | Age: 58
Setting detail: RECURRING SERIES
Discharge: HOME | End: 2023-07-10
Payer: COMMERCIAL

## 2023-07-07 PROCEDURE — 97530 THERAPEUTIC ACTIVITIES: CPT

## 2023-07-07 PROCEDURE — 97110 THERAPEUTIC EXERCISES: CPT

## 2023-07-07 PROCEDURE — 97140 MANUAL THERAPY 1/> REGIONS: CPT

## 2023-07-12 ENCOUNTER — HOSPITAL ENCOUNTER (OUTPATIENT)
Facility: HOSPITAL | Age: 58
Setting detail: RECURRING SERIES
Discharge: HOME | End: 2023-07-15
Payer: COMMERCIAL

## 2023-07-12 PROCEDURE — 97140 MANUAL THERAPY 1/> REGIONS: CPT

## 2023-07-12 PROCEDURE — 97016 VASOPNEUMATIC DEVICE THERAPY: CPT

## 2023-07-12 PROCEDURE — 97530 THERAPEUTIC ACTIVITIES: CPT

## 2023-07-12 PROCEDURE — 97110 THERAPEUTIC EXERCISES: CPT

## 2023-07-12 NOTE — PROGRESS NOTES
Status at last assessment (07/05/2023):  daily compliance    Current:  daily compliance 07/12/2023  The patient will improve left ankle INV strength to 5/5 for improved walking tolerance. Status at last assessment (07/05/2023):  4+/5   Current:    The patient will perform SLS for 15 seconds to improve her gait stability. Status at last assessment (07/05/2023):  SLS unable, tandem unable, 3/4 tandem 30 seconds  Current:  tandem right 15 seconds and left 5 seconds 07/12/2023  The patient will ambulate therapy stairs with reciprocal pattern and good eccentric quad control to improve her home stair tolerance. Status at last assessment (0/705/2023):  ambulates therapy stairs with reciprocal pattern ascending and descending with BUE support and pain at left ankle 2/10 with descending   Current:    The patient will improve FOTO score to 56/100 as a functional indicator of improved mobility.   Status at last assessment (07/05/2023):  45/100   Current:         PLAN  [x]  Continue plan of care  [x]  Upgrade activities as tolerated  []  Discharge due to :  []  Other:      Aleah Verdugo, PT    7/12/2023    9:45 AM    Future Appointments   Date Time Provider [address]   7/14/2023  9:40 AM Aline Skip Rings, PT [facility]   7/19/2023  9:40 AM Aline Skip Rings, PT [facility]   7/21/2023  9:40 AM [facility] PT [place] [facility]   7/26/2023  9:40 AM Aline Skip Rings, PT [facility]   7/28/2023  9:40 AM Aleah Verdugo, PT [facility]   7/31/2023  9:40 AM Aleah Verdugo PT [facility]   8/2/2023  9:40 AM Aleah Verdugo, PT [facility]   8/7/2023  9:40 AM Aline Skip Rings, PT [facility]   8/9/2023  9:40 AM Aline Skip Rings, PT [facility]

## 2023-07-14 ENCOUNTER — HOSPITAL ENCOUNTER (OUTPATIENT)
Facility: HOSPITAL | Age: 58
Setting detail: RECURRING SERIES
Discharge: HOME | End: 2023-07-17
Payer: COMMERCIAL

## 2023-07-14 PROCEDURE — 97016 VASOPNEUMATIC DEVICE THERAPY: CPT

## 2023-07-14 PROCEDURE — 97112 NEUROMUSCULAR REEDUCATION: CPT

## 2023-07-14 PROCEDURE — 97530 THERAPEUTIC ACTIVITIES: CPT

## 2023-07-19 ENCOUNTER — HOSPITAL ENCOUNTER (OUTPATIENT)
Facility: HOSPITAL | Age: 58
Setting detail: RECURRING SERIES
Discharge: HOME | End: 2023-07-22
Payer: COMMERCIAL

## 2023-07-19 PROCEDURE — 97110 THERAPEUTIC EXERCISES: CPT

## 2023-07-19 PROCEDURE — 97140 MANUAL THERAPY 1/> REGIONS: CPT

## 2023-07-19 PROCEDURE — 97016 VASOPNEUMATIC DEVICE THERAPY: CPT

## 2023-07-19 PROCEDURE — 97530 THERAPEUTIC ACTIVITIES: CPT

## 2023-07-19 NOTE — PROGRESS NOTES
Training (timed):    x feet with x (assistive device) over x surfaces with x level of assist. Cuing for x. To improve safety and dynamic movement with household/community ambulation. (see flow sheet as applicable)     Details if applicable:       52    49  [facility] Totals Reminder: bill using total billable min of TIMED therapeutic procedures (example: do not include dry needle or estim unattended, both untimed codes, in totals to left)  8-22 min = 1 unit; 23-37 min = 2 units; 38-52 min = 3 units; 53-67 min = 4 units; 68-82 min = 5 units   Total Total     [x]  Patient Education billed concurrently with other procedures   [x] Review HEP    [] Progressed/Changed HEP, detail:    [] Other detail:       Objective Information/Functional Measures/Assessment:  Tandem: 30 seconds with left foot back without UE support indicating improving gait stability   Increased ROM noted with standing HR/TR   Introduced carlee for improved quad strength and stability   Increased challenge with maintaining quad set with carlee   Will continue to progress as tolerated nv       Patient will continue to benefit from skilled PT / OT services to modify and progress therapeutic interventions, analyze and address functional mobility deficits, analyze and address ROM deficits, analyze and address strength deficits, analyze and address soft tissue restrictions, analyze and cue for proper movement patterns, analyze and address imbalance/dizziness, and instruct in home and community integration to address functional deficits and attain remaining goals. Progress toward goals / Updated goals:  []  See Progress Note/Recertification    LONG-TERM GOALS TO BE ACHIEVED IN 10 treatments: The pt will be IND and compliant with HEP and self management of symptoms. Status at last assessment (07/05/2023):  daily compliance    Current:  daily compliance 07/12/2023  The patient will improve left ankle INV strength to 5/5 for improved walking tolerance.

## 2023-07-21 ENCOUNTER — HOSPITAL ENCOUNTER (OUTPATIENT)
Facility: HOSPITAL | Age: 58
Setting detail: RECURRING SERIES
Discharge: HOME | End: 2023-07-24
Payer: COMMERCIAL

## 2023-07-21 PROCEDURE — 97110 THERAPEUTIC EXERCISES: CPT

## 2023-07-21 PROCEDURE — 97016 VASOPNEUMATIC DEVICE THERAPY: CPT

## 2023-07-21 PROCEDURE — 97112 NEUROMUSCULAR REEDUCATION: CPT

## 2023-07-21 PROCEDURE — 97530 THERAPEUTIC ACTIVITIES: CPT

## 2023-07-21 NOTE — PROGRESS NOTES
PHYSICAL / OCCUPATIONAL THERAPY - DAILY TREATMENT NOTE (updated )    Patient Name: Lc Mercer    Date: 2023    : 1965  Insurance: Payor: Usha Singh / Plan: Hubert Shaw / Product Type: *No Product type* /      Patient  verified yes     Visit #   Current / Total 5 10   Time   In / Out 9:41 (9:55) 10:51   Total Treatment Time 70   Pain   In / Out 0/10 0/10    Subjective Functional Status/Changes: Pt reports stiffness upon arrival, swollen last night     TREATMENT AREA =  Posterior tibial tendinitis, left leg [M76.822]    OBJECTIVE    Modalities Rationale:     decrease edema, decrease inflammation, and decrease pain to improve patient's ability to progress to PLOF and address remaining functional goals. min [] Estim Unattended, type/location:                                      []  w/ice    []  w/heat    min [] Estim Attended, type/location:                                     []  w/US     []  w/ice    []  w/heat    []  TENS insruct      min []  Mechanical Traction: type/lbs                   []  pro   []  sup   []  int   []  cont    []  before manual    []  after manual    min []  Ultrasound, settings/location:      min []  Iontophoresis w/ dexamethasone, location:                                               []  take home patch       []  in clinic    min  unbilled []  Ice     []  Heat    location/position:     min []  Paraffin,  details:    15      min [x]  Vasopneumatic Device, press/temp: LT/MP around L ankle/foot    min []  Meseret Denny / Sherwin Railing:     If using vaso (only need to measure limb vaso being performed on)      pre-treatment girth : 48 cm      post-treatment girth : 48 cm      measured at (landmark location) :  figure 8     min []  Other:    Skin assessment post-treatment (if applicable):    []  intact    []  redness- no adverse reaction                 []redness - adverse reaction:      Vasopnuematic compression justification:  Per bilateral girth measures taken and listed above the

## 2023-07-26 ENCOUNTER — HOSPITAL ENCOUNTER (OUTPATIENT)
Facility: HOSPITAL | Age: 58
Setting detail: RECURRING SERIES
Discharge: HOME | End: 2023-07-29
Payer: COMMERCIAL

## 2023-07-26 PROCEDURE — 97530 THERAPEUTIC ACTIVITIES: CPT

## 2023-07-26 PROCEDURE — 97140 MANUAL THERAPY 1/> REGIONS: CPT

## 2023-07-26 PROCEDURE — 97016 VASOPNEUMATIC DEVICE THERAPY: CPT

## 2023-07-26 PROCEDURE — 97110 THERAPEUTIC EXERCISES: CPT

## 2023-07-26 NOTE — PROGRESS NOTES
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 She states she got new sneakers that is helping with her walking. TREATMENT AREA =  Posterior tibial tendinitis, left leg [M76.822]    OBJECTIVE    Modalities Rationale:     decrease edema, decrease inflammation, and decrease pain to improve patient's ability to progress to PLOF and address remaining functional goals. min [] Estim Unattended, type/location:                                      []  w/ice    []  w/heat    min [] Estim Attended, type/location:                                     []  w/US     []  w/ice    []  w/heat    []  TENS insruct      min []  Mechanical Traction: type/lbs                   []  pro   []  sup   []  int   []  cont    []  before manual    []  after manual    min []  Ultrasound, settings/location:      min []  Iontophoresis w/ dexamethasone, location:                                               []  take home patch       []  in clinic    min  unbilled []  Ice     []  Heat    location/position:     min []  Paraffin,  details:    15       min [x]  Vasopneumatic Device, press/temp: LT/MP around L ankle/foot    min []  Raenette Brisk / Thena Quinn:     If using vaso (only need to measure limb vaso being performed on)      pre-treatment girth : 48 cm      post-treatment girth : 48 cm      measured at (landmark location) :  figure 8     min []  Other:    Skin assessment post-treatment (if applicable):    []  intact    []  redness- no adverse

## 2023-07-28 ENCOUNTER — HOSPITAL ENCOUNTER (OUTPATIENT)
Facility: HOSPITAL | Age: 58
Setting detail: RECURRING SERIES
Discharge: HOME | End: 2023-07-31
Payer: COMMERCIAL

## 2023-07-28 PROCEDURE — 97530 THERAPEUTIC ACTIVITIES: CPT

## 2023-07-28 PROCEDURE — 97140 MANUAL THERAPY 1/> REGIONS: CPT

## 2023-07-28 PROCEDURE — 97016 VASOPNEUMATIC DEVICE THERAPY: CPT

## 2023-07-28 PROCEDURE — 97112 NEUROMUSCULAR REEDUCATION: CPT

## 2023-07-28 PROCEDURE — 97110 THERAPEUTIC EXERCISES: CPT

## 2023-07-28 NOTE — PROGRESS NOTES
PHYSICAL / OCCUPATIONAL THERAPY - DAILY TREATMENT NOTE (updated )    Patient Name: Jayant Orlando Old    Date: 2023    : 1965  Insurance: Payor: Benjie Cover / Plan: Liz Lopez / Product Type: *No Product type* /      Patient  verified yes     Visit #   Current / Total 7 10   Time   In / Out 940 1053    Total Treatment Time 73    Pain   In / Out 0/10 0/10    Subjective Functional Status/Changes: Patient reports she woke up feeling like she was walking normally. TREATMENT AREA =  Posterior tibial tendinitis, left leg [M76.822]    OBJECTIVE    Modalities Rationale:     decrease edema, decrease inflammation, and decrease pain to improve patient's ability to progress to PLOF and address remaining functional goals. min [] Estim Unattended, type/location:                                      []  w/ice    []  w/heat    min [] Estim Attended, type/location:                                     []  w/US     []  w/ice    []  w/heat    []  TENS insruct      min []  Mechanical Traction: type/lbs                   []  pro   []  sup   []  int   []  cont    []  before manual    []  after manual    min []  Ultrasound, settings/location:      min []  Iontophoresis w/ dexamethasone, location:                                               []  take home patch       []  in clinic    min  unbilled []  Ice     []  Heat    location/position:     min []  Paraffin,  details:    15        min [x]  Vasopneumatic Device, press/temp: LT/MP around L ankle/foot    min []  Velvet Cross / Shirley [place]:     If using vaso (only need to measure limb vaso being performed on)      pre-treatment girth : 48 cm      post-treatment girth : 48 cm      measured at (landmark location) :  figure 8     min []  Other:    Skin assessment post-treatment (if applicable):    []  intact    []  redness- no adverse reaction                 []redness - adverse reaction:      Vasopnuematic compression justification:  Per bilateral girth measures taken and listed

## 2023-07-31 ENCOUNTER — HOSPITAL ENCOUNTER (OUTPATIENT)
Facility: HOSPITAL | Age: 58
Setting detail: RECURRING SERIES
Discharge: HOME | End: 2023-08-03
Payer: COMMERCIAL

## 2023-07-31 PROCEDURE — 97140 MANUAL THERAPY 1/> REGIONS: CPT

## 2023-07-31 PROCEDURE — 97016 VASOPNEUMATIC DEVICE THERAPY: CPT

## 2023-07-31 PROCEDURE — 97530 THERAPEUTIC ACTIVITIES: CPT

## 2023-07-31 PROCEDURE — 97112 NEUROMUSCULAR REEDUCATION: CPT

## 2023-07-31 NOTE — PROGRESS NOTES
PHYSICAL / OCCUPATIONAL THERAPY - DAILY TREATMENT NOTE (updated )    Patient Name: Leila Mercer    Date: 2023    : 1965  Insurance: Payor: Mu Haro / Plan: Reba Miller / Product Type: *No Product type* /      Patient  verified yes     Visit #   Current / Total 8 10   Time   In / Out 940 10:50    Total Treatment Time 70    Pain   In / Out 0/10 0/10    Subjective Functional Status/Changes: Patient would like to review ankle ABC's. TREATMENT AREA =  Posterior tibial tendinitis, left leg [M76.822]    OBJECTIVE    Modalities Rationale:     decrease edema, decrease inflammation, and decrease pain to improve patient's ability to progress to PLOF and address remaining functional goals. min [] Estim Unattended, type/location:                                      []  w/ice    []  w/heat    min [] Estim Attended, type/location:                                     []  w/US     []  w/ice    []  w/heat    []  TENS insruct      min []  Mechanical Traction: type/lbs                   []  pro   []  sup   []  int   []  cont    []  before manual    []  after manual    min []  Ultrasound, settings/location:      min []  Iontophoresis w/ dexamethasone, location:                                               []  take home patch       []  in clinic    min  unbilled []  Ice     []  Heat    location/position:     min []  Paraffin,  details:    15         min [x]  Vasopneumatic Device, press/temp: LT/MP around L ankle/foot    min []  Angelia Nab / Pamula Blind:     If using vaso (only need to measure limb vaso being performed on)      pre-treatment girth : 48 cm      post-treatment girth : 48 cm      measured at (landmark location) :  figure 8     min []  Other:    Skin assessment post-treatment (if applicable):    []  intact    []  redness- no adverse reaction                 []redness - adverse reaction:      Vasopnuematic compression justification:  Per bilateral girth measures taken and listed above the edema is

## 2023-08-02 ENCOUNTER — HOSPITAL ENCOUNTER (OUTPATIENT)
Facility: HOSPITAL | Age: 58
Setting detail: RECURRING SERIES
Discharge: HOME | End: 2023-08-05
Payer: COMMERCIAL

## 2023-08-02 PROCEDURE — 97016 VASOPNEUMATIC DEVICE THERAPY: CPT

## 2023-08-02 PROCEDURE — 97110 THERAPEUTIC EXERCISES: CPT

## 2023-08-02 PROCEDURE — 97140 MANUAL THERAPY 1/> REGIONS: CPT

## 2023-08-02 NOTE — PROGRESS NOTES
[facility] PHYSICAL THERAPY  [address] [place] [address] [place] [address]  Phone: [phone]   [phone]  PHYSICAL THERAPY PROGRESS NOTE  Patient Name: Teresa Mercer : 1965   Treatment/Medical Diagnosis: Posterior tibial tendinitis, left leg [M76.822]   Referral Source: Alina Barrett MD     Date of Initial Visit: 2023 Attended Visits: 26 Missed Visits: 0     SUMMARY OF TREATMENT  The pt has been seen for 26 visits to address left ankle pain . Therapeutic interventions have included therapeutic exercise, therapeutic activity, neuromuscular re-education, manual treatment, modalities and patient education.      CURRENT STATUS  % improvement: 90%  Max pain 0/10  Avg pain 0/10  Min pain 0/10     Current improvements: walking without AD or brace, good compliance with HEP, improving tolerance to ambulating stairs at home, improving walking tolerance  Remaining functional limitations: needs B/L handrails for stairs descending > ascending, needs cart for prolonged walking at the store, fatigue in the ankle with prolonged walking, ankle mobility, walking speed      FOTO: 52/100 (+7 points)     Objective Information/Functional Measures/Assessment:  The patient presents for reassessment following 26 visits to address left ankle pain s/p posterior tibialis repair, flexor digitorum longus transfer, medial calcaneal osteotomy 02/15/2023   Improvement in functional mobility indicated by FOTO score change of +7 points to 52/100   2 minute walk test: 275 feet with c/o increased ankle fatigue   SLS: 30 seconds with single finger tip support, unable to perform without finger tip support   Stairs: no pain with ascending or descending, BUE support required for assistance with descending stairs and limited eccentric quad control  The patient will benefit from continued skilled PT to address ongoing limitations and improve QoL     PROGRESS TOWARDS GOALS:  LONG-TERM GOALS TO
PHYSICAL / OCCUPATIONAL THERAPY - DAILY TREATMENT NOTE (updated )    Patient Name: Noah Garcia Old    Date: 2023    : 1965  Insurance: Payor: Rogelio Zhong / Plan: Era Torres / Product Type: *No Product type* /      Patient  verified yes     Visit #   Current / Total 9 10   Time   In / Out 942 10:41    Total Treatment Time 59    Pain   In / Out 0/10 0/10    Subjective Functional Status/Changes: % improvement: 90%  Max pain 0/10  Avg pain 0/10  Min pain 0/10    Current improvements: walking without AD or brace, good compliance with HEP, improving tolerance to ambulating stairs at home, improving walking tolerance  Remaining functional limitations: needs B/L handrails for stairs descending > ascending, needs cart for prolonged walking at the store, fatigue in the ankle with prolonged walking, ankle mobility, walking speed     FOTO: 52/100 (+7 points)       TREATMENT AREA =  Posterior tibial tendinitis, left leg [M76.822]    OBJECTIVE    Modalities Rationale:     decrease edema, decrease inflammation, and decrease pain to improve patient's ability to progress to PLOF and address remaining functional goals. min [] Estim Unattended, type/location:                                      []  w/ice    []  w/heat    min [] Estim Attended, type/location:                                     []  w/US     []  w/ice    []  w/heat    []  TENS insruct      min []  Mechanical Traction: type/lbs                   []  pro   []  sup   []  int   []  cont    []  before manual    []  after manual    min []  Ultrasound, settings/location:      min []  Iontophoresis w/ dexamethasone, location:                                               []  take home patch       []  in clinic    min  unbilled []  Ice     []  Heat    location/position:     min []  Paraffin,  details:    15         min [x]  Vasopneumatic Device, press/temp: LT/MP around L ankle/foot    min []  Olivier Srinivasan / Tremayne Jefferson:     If using vaso (only need to measure limb vaso
12-Mar-2017 18:00

## 2023-08-07 ENCOUNTER — APPOINTMENT (OUTPATIENT)
Facility: HOSPITAL | Age: 58
End: 2023-08-07
Payer: COMMERCIAL

## 2023-08-09 ENCOUNTER — HOSPITAL ENCOUNTER (OUTPATIENT)
Facility: HOSPITAL | Age: 58
Setting detail: RECURRING SERIES
Discharge: HOME | End: 2023-08-12
Payer: COMMERCIAL

## 2023-08-09 PROCEDURE — 97140 MANUAL THERAPY 1/> REGIONS: CPT

## 2023-08-09 PROCEDURE — 97112 NEUROMUSCULAR REEDUCATION: CPT

## 2023-08-09 PROCEDURE — 97016 VASOPNEUMATIC DEVICE THERAPY: CPT

## 2023-08-09 NOTE — PROGRESS NOTES
PHYSICAL / OCCUPATIONAL THERAPY - DAILY TREATMENT NOTE (updated )    Patient Name: Noah Garcia Old    Date: 2023    : 1965  Insurance: Payor: Rogelio Zhong / Plan: Era Torres / Product Type: *No Product type* /      Patient  verified yes     Visit #   Current / Total 1 10   Time   In / Out 943 10:37    Total Treatment Time 54    Pain   In / Out 0/10 0/10    Subjective Functional Status/Changes: Patient reports continued good compliance with HEP. TREATMENT AREA =  Posterior tibial tendinitis, left leg [M76.822]    OBJECTIVE    Modalities Rationale:     decrease edema, decrease inflammation, and decrease pain to improve patient's ability to progress to PLOF and address remaining functional goals. min [] Estim Unattended, type/location:                                      []  w/ice    []  w/heat    min [] Estim Attended, type/location:                                     []  w/US     []  w/ice    []  w/heat    []  TENS insruct      min []  Mechanical Traction: type/lbs                   []  pro   []  sup   []  int   []  cont    []  before manual    []  after manual    min []  Ultrasound, settings/location:      min []  Iontophoresis w/ dexamethasone, location:                                               []  take home patch       []  in clinic    min  unbilled []  Ice     []  Heat    location/position:     min []  Paraffin,  details:    15          min [x]  Vasopneumatic Device, press/temp: LT/MP around L ankle/foot    min []  Lannis Ursa / Tremayne Jefferson:     If using vaso (only need to measure limb vaso being performed on)      pre-treatment girth : 48 cm      post-treatment girth : 48 cm      measured at (landmark location) :  figure 8     min []  Other:    Skin assessment post-treatment (if applicable):    []  intact    []  redness- no adverse reaction                 []redness - adverse reaction:      Vasopnuematic compression justification:  Per bilateral girth measures taken and listed above the

## 2023-08-11 ENCOUNTER — HOSPITAL ENCOUNTER (OUTPATIENT)
Facility: HOSPITAL | Age: 58
Setting detail: RECURRING SERIES
Discharge: HOME | End: 2023-08-14
Payer: COMMERCIAL

## 2023-08-11 PROCEDURE — 97112 NEUROMUSCULAR REEDUCATION: CPT

## 2023-08-11 PROCEDURE — 97530 THERAPEUTIC ACTIVITIES: CPT

## 2023-08-11 PROCEDURE — 97016 VASOPNEUMATIC DEVICE THERAPY: CPT

## 2023-08-11 PROCEDURE — 97140 MANUAL THERAPY 1/> REGIONS: CPT

## 2023-08-11 NOTE — PROGRESS NOTES
PHYSICAL / OCCUPATIONAL THERAPY - DAILY TREATMENT NOTE (updated )    Patient Name: Bailey Damon Old    Date: 2023    : 1965  Insurance: Payor: Mahad Mireles / Plan: Katie Villar / Product Type: *No Product type* /      Patient  verified yes     Visit #   Current / Total 2 10   Time   In / Out 940 1043    Total Treatment Time 63    Pain   In / Out 0/10 0/10    Subjective Functional Status/Changes: Patient reports she is getting better with every day. TREATMENT AREA =  Posterior tibial tendinitis, left leg [M76.822]    OBJECTIVE    Modalities Rationale:     decrease edema, decrease inflammation, and decrease pain to improve patient's ability to progress to PLOF and address remaining functional goals. min [] Estim Unattended, type/location:                                      []  w/ice    []  w/heat    min [] Estim Attended, type/location:                                     []  w/US     []  w/ice    []  w/heat    []  TENS insruct      min []  Mechanical Traction: type/lbs                   []  pro   []  sup   []  int   []  cont    []  before manual    []  after manual    min []  Ultrasound, settings/location:      min []  Iontophoresis w/ dexamethasone, location:                                               []  take home patch       []  in clinic    min  unbilled []  Ice     []  Heat    location/position:     min []  Paraffin,  details:    15        min [x]  Vasopneumatic Device, press/temp: LT/MP around L ankle/foot    min []  Tia Tinsley / Nael Hay:     If using vaso (only need to measure limb vaso being performed on)      pre-treatment girth : 48 cm      post-treatment girth : 48 cm      measured at (landmark location) :  figure 8     min []  Other:    Skin assessment post-treatment (if applicable):    []  intact    []  redness- no adverse reaction                 []redness - adverse reaction:      Vasopnuematic compression justification:  Per bilateral girth measures taken and listed above the edema

## 2023-08-14 ENCOUNTER — HOSPITAL ENCOUNTER (OUTPATIENT)
Facility: HOSPITAL | Age: 58
Setting detail: RECURRING SERIES
Discharge: HOME | End: 2023-08-17
Payer: COMMERCIAL

## 2023-08-14 PROCEDURE — 97140 MANUAL THERAPY 1/> REGIONS: CPT

## 2023-08-14 PROCEDURE — 97016 VASOPNEUMATIC DEVICE THERAPY: CPT

## 2023-08-14 PROCEDURE — 97112 NEUROMUSCULAR REEDUCATION: CPT

## 2023-08-14 PROCEDURE — 97530 THERAPEUTIC ACTIVITIES: CPT

## 2023-08-14 NOTE — PROGRESS NOTES
PHYSICAL / OCCUPATIONAL THERAPY - DAILY TREATMENT NOTE (updated )    Patient Name: Mendoza Romero Old    Date: 2023    : 1965  Insurance: Payor: Rex Salazar / Plan: Phong Ballard / Product Type: *No Product type* /      Patient  verified yes     Visit #   Current / Total 3 10   Time   In / Out 942 1050    Total Treatment Time 68    Pain   In / Out 0/10 0/10    Subjective Functional Status/Changes: Patient report she did a lot of exercises in the pool this weekend. TREATMENT AREA =  Posterior tibial tendinitis, left leg [M76.822]    OBJECTIVE    Modalities Rationale:     decrease edema, decrease inflammation, and decrease pain to improve patient's ability to progress to PLOF and address remaining functional goals. min [] Estim Unattended, type/location:                                      []  w/ice    []  w/heat    min [] Estim Attended, type/location:                                     []  w/US     []  w/ice    []  w/heat    []  TENS insruct      min []  Mechanical Traction: type/lbs                   []  pro   []  sup   []  int   []  cont    []  before manual    []  after manual    min []  Ultrasound, settings/location:      min []  Iontophoresis w/ dexamethasone, location:                                               []  take home patch       []  in clinic    min  unbilled []  Ice     []  Heat    location/position:     min []  Paraffin,  details:    15         min [x]  Vasopneumatic Device, press/temp: LT/MP around L ankle/foot    min []  Colby Diaz / Stephanie Gonzalez:     If using vaso (only need to measure limb vaso being performed on)      pre-treatment girth : 48 cm      post-treatment girth : 48 cm      measured at (landmark location) :  figure 8     min []  Other:    Skin assessment post-treatment (if applicable):    []  intact    []  redness- no adverse reaction                 []redness - adverse reaction:      Vasopnuematic compression justification:  Per bilateral girth measures taken and listed

## 2023-08-16 ENCOUNTER — HOSPITAL ENCOUNTER (OUTPATIENT)
Facility: HOSPITAL | Age: 58
Setting detail: RECURRING SERIES
Discharge: HOME | End: 2023-08-19
Payer: COMMERCIAL

## 2023-08-16 PROCEDURE — 97112 NEUROMUSCULAR REEDUCATION: CPT

## 2023-08-16 PROCEDURE — 97016 VASOPNEUMATIC DEVICE THERAPY: CPT

## 2023-08-16 PROCEDURE — 97530 THERAPEUTIC ACTIVITIES: CPT

## 2023-08-16 PROCEDURE — 97140 MANUAL THERAPY 1/> REGIONS: CPT

## 2023-08-16 NOTE — PROGRESS NOTES
PHYSICAL / OCCUPATIONAL THERAPY - DAILY TREATMENT NOTE (updated )    Patient Name: Edgar Vance Old    Date: 2023    : 1965  Insurance: Payor: Karen Roman / Plan: Que Harris / Product Type: *No Product type* /      Patient  verified yes     Visit #   Current / Total 4 10   Time   In / Out 201 315   Total Treatment Time 74    Pain   In / Out 0/10 0/10    Subjective Functional Status/Changes: Patient report she did a lot of exercises in the pool this weekend. TREATMENT AREA =  Posterior tibial tendinitis, left leg [M76.822]    OBJECTIVE    Modalities Rationale:     decrease edema, decrease inflammation, and decrease pain to improve patient's ability to progress to PLOF and address remaining functional goals. min [] Estim Unattended, type/location:                                      []  w/ice    []  w/heat    min [] Estim Attended, type/location:                                     []  w/US     []  w/ice    []  w/heat    []  TENS insruct      min []  Mechanical Traction: type/lbs                   []  pro   []  sup   []  int   []  cont    []  before manual    []  after manual    min []  Ultrasound, settings/location:      min []  Iontophoresis w/ dexamethasone, location:                                               []  take home patch       []  in clinic    min  unbilled []  Ice     []  Heat    location/position:     min []  Paraffin,  details:    15         min [x]  Vasopneumatic Device, press/temp: LT/MP around L ankle/foot    min []  Radha Morales / Herminio Acre:     If using vaso (only need to measure limb vaso being performed on)      pre-treatment girth : 48 cm      post-treatment girth : 48 cm      measured at (landmark location) :  figure 8     min []  Other:    Skin assessment post-treatment (if applicable):    []  intact    []  redness- no adverse reaction                 []redness - adverse reaction:      Vasopnuematic compression justification:  Per bilateral girth measures taken and listed

## 2023-08-21 ENCOUNTER — HOSPITAL ENCOUNTER (OUTPATIENT)
Facility: HOSPITAL | Age: 58
Setting detail: RECURRING SERIES
Discharge: HOME | End: 2023-08-24
Payer: COMMERCIAL

## 2023-08-21 PROCEDURE — 97112 NEUROMUSCULAR REEDUCATION: CPT

## 2023-08-21 PROCEDURE — 97016 VASOPNEUMATIC DEVICE THERAPY: CPT

## 2023-08-21 PROCEDURE — 97140 MANUAL THERAPY 1/> REGIONS: CPT

## 2023-08-21 PROCEDURE — 97110 THERAPEUTIC EXERCISES: CPT

## 2023-08-21 NOTE — PROGRESS NOTES
LOWERING   SLS -TC  Lunge with slider   10      53 [id number] Manual Therapy (timed):  decrease pain, increase ROM, increase tissue extensibility, and decrease trigger points to improve patient's ability to progress to PLOF and address remaining functional goals. The manual therapy interventions were performed at a separate and distinct time from the therapeutic activities interventions . (see flow sheet as applicable)     Details if applicable:    S/DTM to L plantar fascia, post tib, lateral aspect of ankle, PROM for INV, great toe mobilizations, STM to anterior tib/posterior   TC   TC  [id number] Gait Training (timed):    x feet with x (assistive device) over x surfaces with x level of assist. Cuing for x. To improve safety and dynamic movement with household/community ambulation.   (see flow sheet as applicable)     Details if applicable:       52         49  [facility] Totals Reminder: bill using total billable min of TIMED therapeutic procedures (example: do not include dry needle or estim unattended, both untimed codes, in totals to left)  8-22 min = 1 unit; 23-37 min = 2 units; 38-52 min = 3 units; 53-67 min = 4 units; 68-82 min = 5 units   Total Total     [x]  Patient Education billed concurrently with other procedures   [x] Review HEP    [] Progressed/Changed HEP, detail:    [] Other detail:       Objective Information/Functional Measures/Assessment:    No TTP at posterior tib or surgical sites   Continued challenge with TB inversion and TB posterior tib, performed with increased hold for improved muscle activation    Improved tolerance to lateral BOSU step ups today    Ankle inv strength: 4/5 without pain  Progress per patient tolerance nv     Patient will continue to benefit from skilled PT / OT services to modify and progress therapeutic interventions, analyze and address functional mobility deficits, analyze and address ROM deficits, analyze and address strength deficits, analyze and address soft tissue

## 2023-08-23 ENCOUNTER — HOSPITAL ENCOUNTER (OUTPATIENT)
Facility: HOSPITAL | Age: 58
Setting detail: RECURRING SERIES
Discharge: HOME | End: 2023-08-26
Payer: COMMERCIAL

## 2023-08-23 PROCEDURE — 97530 THERAPEUTIC ACTIVITIES: CPT

## 2023-08-23 PROCEDURE — 97110 THERAPEUTIC EXERCISES: CPT

## 2023-08-28 ENCOUNTER — HOSPITAL ENCOUNTER (OUTPATIENT)
Facility: HOSPITAL | Age: 58
Setting detail: RECURRING SERIES
Discharge: HOME | End: 2023-08-31
Payer: COMMERCIAL

## 2023-08-28 PROCEDURE — 97530 THERAPEUTIC ACTIVITIES: CPT

## 2023-08-28 PROCEDURE — 97110 THERAPEUTIC EXERCISES: CPT

## 2023-08-28 PROCEDURE — 97112 NEUROMUSCULAR REEDUCATION: CPT

## 2023-08-28 NOTE — PROGRESS NOTES
PHYSICAL / OCCUPATIONAL THERAPY - DAILY TREATMENT NOTE (updated )    Patient Name: Arden Rousseau Old    Date: 2023    : 1965  Insurance: Payor: Whitewood Castor / Plan: Margarito León / Product Type: *No Product type* /      Patient  verified yes     Visit #   Current / Total 7 10   Time   In / Out 900 956    Total Treatment Time 56    Pain   In / Out 0/10 0/10    Subjective Functional Status/Changes: Patient reports she is feeling good. TREATMENT AREA =  Posterior tibial tendinitis, left leg [M76.822]    OBJECTIVE    Modalities Rationale:     decrease edema, decrease inflammation, and decrease pain to improve patient's ability to progress to PLOF and address remaining functional goals. min [] Estim Unattended, type/location:                                      []  w/ice    []  w/heat    min [] Estim Attended, type/location:                                     []  w/US     []  w/ice    []  w/heat    []  TENS insruct      min []  Mechanical Traction: type/lbs                   []  pro   []  sup   []  int   []  cont    []  before manual    []  after manual    min []  Ultrasound, settings/location:      min []  Iontophoresis w/ dexamethasone, location:                                               []  take home patch       []  in clinic   10  min  unbilled [x]  Ice     []  Heat    location/position: Reclined to left ankle    min []  Paraffin,  details:    x          min [x]  Vasopneumatic Device, press/temp: LT/MP around L ankle/foot    min []  Cielo Salon / Delos Overcast:     If using vaso (only need to measure limb vaso being performed on)      pre-treatment girth : 48 cm      post-treatment girth : 48 cm      measured at (landmark location) :  figure 8     min []  Other:    Skin assessment post-treatment (if applicable):    []  intact    []  redness- no adverse reaction                 []redness - adverse reaction:      Vasopnuematic compression justification:  Per bilateral girth measures taken and listed above

## 2023-08-30 ENCOUNTER — HOSPITAL ENCOUNTER (OUTPATIENT)
Facility: HOSPITAL | Age: 58
Setting detail: RECURRING SERIES
Discharge: HOME | End: 2023-09-02
Payer: COMMERCIAL

## 2023-08-30 PROCEDURE — 97530 THERAPEUTIC ACTIVITIES: CPT

## 2023-08-30 PROCEDURE — 97016 VASOPNEUMATIC DEVICE THERAPY: CPT

## 2023-08-30 PROCEDURE — 97110 THERAPEUTIC EXERCISES: CPT

## 2023-08-30 NOTE — PROGRESS NOTES
flexion 4+/5, ABD 5/5, ext 4+/5, glute max 4/5   The patient will benefit from continued skilled PT to address ongoing limitations and improve QoL    PROGRESS TOWARDS GOALS:  LONG-TERM GOALS TO BE ACHIEVED IN 10 treatments: The pt will be IND and compliant with HEP and self management of symptoms. Status at last assessment (08/02/2023): daily compliance   Current: daily compliance 08/09/2023, daily compliance 08/30/2023  The patient will improve left ankle INV strength to 5/5 for improved walking tolerance. Status at last assessment (08/02/2023):  4+/5   Current: 4/5 without pain 08/21/2023, 4/5 without pain 08/30/2023  The patient will perform SLS for 15 seconds to improve her gait stability. Status at last assessment (08/02/2023): 30 seconds with single finger tip support, unable to perform without finger tip support    Current: frequent UE support required for 3 second hold during marching in place 08/14/2023, 3 seconds without UE support 08/30/2023  The patient will ambulate therapy stairs with reciprocal pattern and good eccentric quad control to improve her home stair tolerance. Status at last assessment (08/02/2023):  no pain with ascending or descending, BUE support required for assistance with descending stairs and limited eccentric quad control 08/02/2023  Current: intermittent UE support required for descending 08/30/2023  The patient will improve FOTO score to 56/100 as a functional indicator of improved mobility. Status at last assessment (08/02/2023):  52/100   Current: MET 65/100 08/30/2023    LONG-TERM GOALS TO BE ACHIEVED IN 10 treatments: The patient will be IND and compliant with HEP to prepare for D/C. Status at last assessment (08/30/2023):  compliant    Current:    The patient improve left ankle INV strength to 5/5 for improved walking tolerance.   Status at last assessment (08/30/2023):  4/5 without pain   Current:    The patient will perform SLS for 15 seconds to improve her
Details if applicable:    BOSU step up fwd, lat   Soleus HR at TG   // bars single leg RDL   B/L HR WITH SINGLE LEG ECC LOWERING   Slider lunges with foam   Star taps     TC    SLS -TC  Lunge with slider   held held [id number] Manual Therapy (timed):  decrease pain, increase ROM, increase tissue extensibility, and decrease trigger points to improve patient's ability to progress to PLOF and address remaining functional goals. The manual therapy interventions were performed at a separate and distinct time from the therapeutic activities interventions . (see flow sheet as applicable)     Details if applicable:    S/DTM to L plantar fascia, post tib, lateral aspect of ankle, PROM for INV, great toe mobilizations, STM to anterior tib/posterior   TC   TC  [id number] Gait Training (timed):    x feet with x (assistive device) over x surfaces with x level of assist. Cuing for x. To improve safety and dynamic movement with household/community ambulation.   (see flow sheet as applicable)     Details if applicable:       37            41 [facility] Totals Reminder: bill using total billable min of TIMED therapeutic procedures (example: do not include dry needle or estim unattended, both untimed codes, in totals to left)  8-22 min = 1 unit; 23-37 min = 2 units; 38-52 min = 3 units; 53-67 min = 4 units; 68-82 min = 5 units   Total Total     [x]  Patient Education billed concurrently with other procedures   [x] Review HEP    [] Progressed/Changed HEP, detail:    [] Other detail:       Objective Information/Functional Measures/Assessment:    The patient presents for reassessment following 34 visits to address s/p posterior tibialis repair, flexor digitorum longus transfer, medial calcaneal osteotomy 02/15/2023     Improvement in functional mobility indicated by FOTO score change of +13 points to 65/100   2 minute walk test: 282 feet without c/o ankle fatigue   Left ankle strength: 4/5 without pain    SLR: mild quad lag    Stairs:

## 2023-09-06 ENCOUNTER — HOSPITAL ENCOUNTER (OUTPATIENT)
Facility: HOSPITAL | Age: 58
Setting detail: RECURRING SERIES
Discharge: HOME | End: 2023-09-09
Payer: COMMERCIAL

## 2023-09-06 PROCEDURE — 97112 NEUROMUSCULAR REEDUCATION: CPT | Performed by: GENERAL ACUTE CARE HOSPITAL

## 2023-09-06 PROCEDURE — 97530 THERAPEUTIC ACTIVITIES: CPT | Performed by: GENERAL ACUTE CARE HOSPITAL

## 2023-09-06 PROCEDURE — 97016 VASOPNEUMATIC DEVICE THERAPY: CPT | Performed by: GENERAL ACUTE CARE HOSPITAL

## 2023-09-06 PROCEDURE — 97110 THERAPEUTIC EXERCISES: CPT | Performed by: GENERAL ACUTE CARE HOSPITAL

## 2023-09-06 NOTE — PROGRESS NOTES
PHYSICAL / OCCUPATIONAL THERAPY - DAILY TREATMENT NOTE (updated )    Patient Name: Hina Haque Old    Date: 2023    : 1965  Insurance: Payor: Edmar Kern / Plan: Natalya Plater / Product Type: *No Product type* /      Patient  verified yes     Visit #   Current / Total 1 10   Time   In / Out 9:00 958    Total Treatment Time 58    Pain   In / Out 0/10 0/10    Subjective Functional Status/Changes: Patient reports she felt good walking around [place] for a long time. TREATMENT AREA =  Posterior tibial tendinitis, left leg [M76.822]    OBJECTIVE    Modalities Rationale:     decrease edema, decrease inflammation, and decrease pain to improve patient's ability to progress to PLOF and address remaining functional goals. min [] Estim Unattended, type/location:                                      []  w/ice    []  w/heat    min [] Estim Attended, type/location:                                     []  w/US     []  w/ice    []  w/heat    []  TENS insruct      min []  Mechanical Traction: type/lbs                   []  pro   []  sup   []  int   []  cont    []  before manual    []  after manual    min []  Ultrasound, settings/location:      min []  Iontophoresis w/ dexamethasone, location:                                               []  take home patch       []  in clinic   x  min  unbilled [x]  Ice     []  Heat    location/position: Reclined to left ankle    min []  Paraffin,  details:    15          min [x]  Vasopneumatic Device, press/temp: LT/MP around L ankle/foot    min []  Bharat Juan / Levern Boss:     If using vaso (only need to measure limb vaso being performed on)      pre-treatment girth : 48 cm      post-treatment girth : 48 cm      measured at (landmark location) :  figure 8     min []  Other:    Skin assessment post-treatment (if applicable):    []  intact    []  redness- no adverse reaction                 []redness - adverse reaction:      Vasopnuematic compression justification:  Per bilateral

## 2023-09-08 ENCOUNTER — HOSPITAL ENCOUNTER (OUTPATIENT)
Facility: HOSPITAL | Age: 58
Setting detail: RECURRING SERIES
Discharge: HOME | End: 2023-09-11
Payer: COMMERCIAL

## 2023-09-08 PROCEDURE — 97112 NEUROMUSCULAR REEDUCATION: CPT | Performed by: GENERAL ACUTE CARE HOSPITAL

## 2023-09-08 PROCEDURE — 97530 THERAPEUTIC ACTIVITIES: CPT | Performed by: GENERAL ACUTE CARE HOSPITAL

## 2023-09-08 PROCEDURE — 97016 VASOPNEUMATIC DEVICE THERAPY: CPT | Performed by: GENERAL ACUTE CARE HOSPITAL

## 2023-09-08 NOTE — PROGRESS NOTES
Note/Recertification  LONG-TERM GOALS TO BE ACHIEVED IN 10 treatments: The patient will be IND and compliant with HEP to prepare for D/C. Status at last assessment (08/30/2023):  compliant    Current:    The patient improve left ankle INV strength to 5/5 for improved walking tolerance. Status at last assessment (08/30/2023):  4/5 without pain   Current:    The patient will perform SLS for 15 seconds to improve her gait stability. Status at last assessment (08/30/2023):  3 seconds without UE support   Current:  NO CHANGE 3 seconds max 09/08/2023   The patient will ambulate therapy stairs with reciprocal pattern and good eccentric quad control to improve her home stair tolerance.    Status at last assessment (08/30/2023):  intermittent UE support required for descending   Current:         PLAN  [x]  Continue plan of care  []  Upgrade activities as tolerated  []  Discharge due to :  [x]  Other: CHECK GOALS [address], PT    9/8/2023    9:57 AM    Future Appointments   Date Time Provider [address] [place] [address]   9/13/2023  8:20 AM Che PT [facility]   9/15/2023  9:00 AM Aline Valdovinso, [address] [place] [address] [facility]   9/19/2023 11:00 AM Aline Valdovinos, [address] [place] [address] [facility]   9/20/2023  9:00 AM Che PT [facility]   9/27/2023  9:00 AM Aline Valdovinos PT [facility]   9/29/2023  9:00 AM Aline Daugherty

## 2023-09-13 ENCOUNTER — HOSPITAL ENCOUNTER (OUTPATIENT)
Facility: HOSPITAL | Age: 58
Setting detail: RECURRING SERIES
Discharge: HOME | End: 2023-09-16
Payer: COMMERCIAL

## 2023-09-13 PROCEDURE — 97112 NEUROMUSCULAR REEDUCATION: CPT | Performed by: GENERAL ACUTE CARE HOSPITAL

## 2023-09-13 PROCEDURE — 97110 THERAPEUTIC EXERCISES: CPT | Performed by: GENERAL ACUTE CARE HOSPITAL

## 2023-09-13 PROCEDURE — 97016 VASOPNEUMATIC DEVICE THERAPY: CPT | Performed by: GENERAL ACUTE CARE HOSPITAL

## 2023-09-13 PROCEDURE — 97530 THERAPEUTIC ACTIVITIES: CPT | Performed by: GENERAL ACUTE CARE HOSPITAL

## 2023-09-13 NOTE — PROGRESS NOTES
were performed at a separate and distinct time from the therapeutic activities interventions . (see flow sheet as applicable)     Details if applicable:    S/DTM to L plantar fascia, post tib, lateral aspect of ankle, PROM for INV, great toe mobilizations, STM to anterior tib/posterior   TC   TC  [id number] Gait Training (timed):    x feet with x (assistive device) over x surfaces with x level of assist. Cuing for x. To improve safety and dynamic movement with household/community ambulation. (see flow sheet as applicable)     Details if applicable:       41              38  [facility] Totals Reminder: bill using total billable min of TIMED therapeutic procedures (example: do not include dry needle or estim unattended, both untimed codes, in totals to left)  8-22 min = 1 unit; 23-37 min = 2 units; 38-52 min = 3 units; 53-67 min = 4 units; 68-82 min = 5 units   Total Total     [x]  Patient Education billed concurrently with other procedures   [x] Review HEP    [] Progressed/Changed HEP, detail:    [] Other detail:       Objective Information/Functional Measures/Assessment:    tairs: no UE support ascending or descending, mild limitation in eccentric quad control  Continues to require finger tip assistance with SLS activities   Introduced bent hydrants with TB for improved glute strength, patient required multiple rest breaks due to c/o hip muscle fatigue   Added repeaters from 6 inch step for improved glute strength   No c/o increased ankle pain today, however continued challenge with single leg eccentric lowering from B/L heel raise.  Added single leg HR with 10 second hold   Increased resistance for total gym squats      Progress as tolerated nv                    Patient will continue to benefit from skilled PT / OT services to modify and progress therapeutic interventions, analyze and address functional mobility deficits, analyze and address ROM deficits, analyze and address strength deficits, analyze and address soft

## 2023-09-15 ENCOUNTER — HOSPITAL ENCOUNTER (OUTPATIENT)
Facility: HOSPITAL | Age: 58
Setting detail: RECURRING SERIES
Discharge: HOME | End: 2023-09-18
Payer: COMMERCIAL

## 2023-09-15 PROCEDURE — 97016 VASOPNEUMATIC DEVICE THERAPY: CPT | Performed by: GENERAL ACUTE CARE HOSPITAL

## 2023-09-15 PROCEDURE — 97530 THERAPEUTIC ACTIVITIES: CPT | Performed by: GENERAL ACUTE CARE HOSPITAL

## 2023-09-15 PROCEDURE — 97112 NEUROMUSCULAR REEDUCATION: CPT | Performed by: GENERAL ACUTE CARE HOSPITAL

## 2023-09-15 PROCEDURE — 97110 THERAPEUTIC EXERCISES: CPT | Performed by: GENERAL ACUTE CARE HOSPITAL

## 2023-09-15 NOTE — PROGRESS NOTES
PHYSICAL / OCCUPATIONAL THERAPY - DAILY TREATMENT NOTE (updated )    Patient Name: Edgar Lopez Old    Date: 9/15/2023    : 1965  Insurance: Payor: Octavia Portillo / Plan: Gamaliel Conti / Product Type: *No Product type* /      Patient  verified yes     Visit #   Current / Total 4 10   Time   In / Out 900 10:03   Total Treatment Time 63    Pain   In / Out 0/10 0/10    Subjective Functional Status/Changes: Patient reports no soreness after exercise progressions last visit. She states she walked a lot on grass yesterday and felt her body adjusting to the uneven surface. TREATMENT AREA =  Posterior tibial tendinitis, left leg [M76.822]    OBJECTIVE    Modalities Rationale:     decrease edema, decrease inflammation, and decrease pain to improve patient's ability to progress to PLOF and address remaining functional goals. min [] Estim Unattended, type/location:                                      []  w/ice    []  w/heat    min [] Estim Attended, type/location:                                     []  w/US     []  w/ice    []  w/heat    []  TENS insruct      min []  Mechanical Traction: type/lbs                   []  pro   []  sup   []  int   []  cont    []  before manual    []  after manual    min []  Ultrasound, settings/location:      min []  Iontophoresis w/ dexamethasone, location:                                               []  take home patch       []  in clinic   x  min  unbilled [x]  Ice     []  Heat    location/position: Reclined to left ankle    min []  Paraffin,  details:    15            min [x]  Vasopneumatic Device, press/temp: LT/MP around L ankle/foot    min []  Destiney Fletcher / Gianni Cecelia:     If using vaso (only need to measure limb vaso being performed on)      pre-treatment girth : 48 cm      post-treatment girth : 48 cm      measured at (landmark location) :  figure 8     min []  Other:    Skin assessment post-treatment (if applicable):    []  intact    []  redness- no adverse reaction

## 2023-09-19 ENCOUNTER — APPOINTMENT (OUTPATIENT)
Facility: HOSPITAL | Age: 58
End: 2023-09-19
Payer: COMMERCIAL

## 2023-09-20 ENCOUNTER — HOSPITAL ENCOUNTER (OUTPATIENT)
Facility: HOSPITAL | Age: 58
Setting detail: RECURRING SERIES
Discharge: HOME | End: 2023-09-23
Payer: COMMERCIAL

## 2023-09-20 PROCEDURE — 97110 THERAPEUTIC EXERCISES: CPT | Performed by: GENERAL ACUTE CARE HOSPITAL

## 2023-09-20 PROCEDURE — 97016 VASOPNEUMATIC DEVICE THERAPY: CPT | Performed by: GENERAL ACUTE CARE HOSPITAL

## 2023-09-20 NOTE — PROGRESS NOTES
ability to progress to PLOF and address remaining functional goals. The manual therapy interventions were performed at a separate and distinct time from the therapeutic activities interventions . (see flow sheet as applicable)     Details if applicable:    S/DTM to L plantar fascia, post tib, lateral aspect of ankle, PROM for INV, great toe mobilizations, STM to anterior tib/posterior   TC   TC  [id number] Gait Training (timed):    x feet with x (assistive device) over x surfaces with x level of assist. Cuing for x. To improve safety and dynamic movement with household/community ambulation. (see flow sheet as applicable)     Details if applicable:       46  10 [facility] Totals Reminder: bill using total billable min of TIMED therapeutic procedures (example: do not include dry needle or estim unattended, both untimed codes, in totals to left)  8-22 min = 1 unit; 23-37 min = 2 units; 38-52 min = 3 units; 53-67 min = 4 units; 68-82 min = 5 units   Total Total     [x]  Patient Education billed concurrently with other procedures   [x] Review HEP    [] Progressed/Changed HEP, detail:    [] Other detail:       Objective Information/Functional Measures/Assessment:    Increased challenge with single leg HR today likely due to soreness from walk yesterday   Improved hip flexor strength as the patient performed 12 repetitions of carlee without c/o muscle fatigue   Plan to assess therapy goals nv and progress as tolerated             Patient will continue to benefit from skilled PT / OT services to modify and progress therapeutic interventions, analyze and address functional mobility deficits, analyze and address ROM deficits, analyze and address strength deficits, analyze and address soft tissue restrictions, analyze and cue for proper movement patterns, analyze and address imbalance/dizziness, and instruct in home and community integration to address functional deficits and attain remaining goals.     Progress toward goals / Updated

## 2023-09-21 ENCOUNTER — HOSPITAL ENCOUNTER (OUTPATIENT)
Facility: HOSPITAL | Age: 58
Setting detail: RECURRING SERIES
Discharge: HOME | End: 2023-09-24
Payer: COMMERCIAL

## 2023-09-21 PROCEDURE — 97530 THERAPEUTIC ACTIVITIES: CPT | Performed by: GENERAL ACUTE CARE HOSPITAL

## 2023-09-21 PROCEDURE — 97112 NEUROMUSCULAR REEDUCATION: CPT | Performed by: GENERAL ACUTE CARE HOSPITAL

## 2023-09-21 PROCEDURE — 97016 VASOPNEUMATIC DEVICE THERAPY: CPT | Performed by: GENERAL ACUTE CARE HOSPITAL

## 2023-09-21 NOTE — PROGRESS NOTES
PHYSICAL / OCCUPATIONAL THERAPY - DAILY TREATMENT NOTE (updated )    Patient Name: Debra Banks Old    Date: 2023    : 1965  Insurance: Payor: Janessa Santoro / Plan: Elveria Lombard / Product Type: *No Product type* /      Patient  verified yes     Visit #   Current / Total 6 10   Time   In / Out 403 455    Total Treatment Time 52    Pain   In / Out 0/10 0/10    Subjective Functional Status/Changes: Patient reports she is a little sore but feeling good today. TREATMENT AREA =  Posterior tibial tendinitis, left leg [M76.822]    OBJECTIVE    Modalities Rationale:     decrease edema, decrease inflammation, and decrease pain to improve patient's ability to progress to PLOF and address remaining functional goals. min [] Estim Unattended, type/location:                                      []  w/ice    []  w/heat    min [] Estim Attended, type/location:                                     []  w/US     []  w/ice    []  w/heat    []  TENS insruct      min []  Mechanical Traction: type/lbs                   []  pro   []  sup   []  int   []  cont    []  before manual    []  after manual    min []  Ultrasound, settings/location:      min []  Iontophoresis w/ dexamethasone, location:                                               []  take home patch       []  in clinic   x  min  unbilled [x]  Ice     []  Heat    location/position: Reclined to left ankle    min []  Paraffin,  details:    15             min [x]  Vasopneumatic Device, press/temp: LT/MP around L ankle/foot    min []  Leon Gravely / Manuel [place]:     If using vaso (only need to measure limb vaso being performed on)      pre-treatment girth : 48 cm      post-treatment girth : 48 cm      measured at (landmark location) :  figure 8     min []  Other:    Skin assessment post-treatment (if applicable):    []  intact    []  redness- no adverse reaction                 []redness - adverse reaction:      Vasopnuematic compression justification:  Per bilateral girth

## 2023-09-27 ENCOUNTER — HOSPITAL ENCOUNTER (OUTPATIENT)
Facility: HOSPITAL | Age: 58
Setting detail: RECURRING SERIES
End: 2023-09-27
Payer: COMMERCIAL

## 2023-09-29 ENCOUNTER — HOSPITAL ENCOUNTER (OUTPATIENT)
Facility: HOSPITAL | Age: 58
Setting detail: RECURRING SERIES
End: 2023-09-29
Payer: COMMERCIAL

## 2023-09-29 PROCEDURE — 97110 THERAPEUTIC EXERCISES: CPT | Performed by: GENERAL ACUTE CARE HOSPITAL

## 2023-09-29 NOTE — PROGRESS NOTES
PHYSICAL / OCCUPATIONAL THERAPY - DAILY TREATMENT NOTE (updated )    Patient Name: Mendoza Romero Old    Date: 2023    : 1965  Insurance: Payor: Rex Salazar / Plan: Phong Ballard / Product Type: *No Product type* /      Patient  verified yes     Visit #   Current / Total 7 10   Time   In / Out 900 933   Total Treatment Time 33    Pain   In / Out 0/10 0/10    Subjective Functional Status/Changes: % improvement: 98%  Max pain 3/10  Avg pain 0/10  Min pain 0/10    Current improvements: improved walking tolerance, improved standing tolerance, improved stair tolerance, improved standing tolerance  Remaining functional limitations: decreased confidence in ability to perform field visits at work, discomfort with     FOTO: 67/100       TREATMENT AREA =  Posterior tibial tendinitis, left leg [M76.822]    OBJECTIVE    Modalities Rationale:     decrease edema, decrease inflammation, and decrease pain to improve patient's ability to progress to PLOF and address remaining functional goals. min [] Estim Unattended, type/location:                                      []  w/ice    []  w/heat    min [] Estim Attended, type/location:                                     []  w/US     []  w/ice    []  w/heat    []  TENS insruct      min []  Mechanical Traction: type/lbs                   []  pro   []  sup   []  int   []  cont    []  before manual    []  after manual    min []  Ultrasound, settings/location:      min []  Iontophoresis w/ dexamethasone, location:                                               []  take home patch       []  in clinic   x  min  unbilled [x]  Ice     []  Heat    location/position: Reclined to left ankle    min []  Paraffin,  details:    PD             min [x]  Vasopneumatic Device, press/temp: LT/MP around L ankle/foot    min []  Valoscare Bertsal / Stephanie Rockdon:     If using vaso (only need to measure limb vaso being performed on)      pre-treatment girth : 48 cm      post-treatment girth : 48 cm      measured

## 2023-09-29 NOTE — PROGRESS NOTES
[facility] PHYSICAL THERAPY  [address] [place] [address] [place] [address]  Phone: [phone]   [phone]  PHYSICAL THERAPY PROGRESS NOTE  Patient Name: Chaparro Mercer : 1965   Treatment/Medical Diagnosis: Posterior tibial tendinitis, left leg [M76.822]   Referral Source: Henrry Joe MD     Date of Initial Visit: 2023 Attended Visits: 41 Missed Visits: 0     SUMMARY OF TREATMENT  The pt has been seen for 41 visits to address  left ankle pain s/p posterior tibialis repair, flexor digitorum longus transfer, medial calcaneal osteotomy 02/15/2023. Therapeutic interventions have included therapeutic exercise, therapeutic activity, neuromuscular re-education, manual treatment, modalities and patient education. CURRENT STATUS  % improvement: 98%  Max pain 3/10  Avg pain 0/10  Min pain 0/10     Current improvements: improved walking tolerance, improved standing tolerance, improved stair tolerance, improved standing tolerance  Remaining functional limitations: decreased confidence in ability to perform field visits at work, discomfort with      FOTO: 67/100      Objective Information/Functional Measures/Assessment:     The patient presents for reassessment following 41 visits to address left ankle pain s/p posterior tibialis repair, flexor digitorum longus transfer, medial calcaneal osteotomy 02/15/2023   Improvement in functional mobility indicated by FOTO score change of +2 points to 67/100   Continued limitation in SLS tolerance with patient performing 3 seconds max on the left  She has maintained daily compliance with HEP, reviewed updated program   Left ankle inversion strength improved to 5/5 without pain  The patient will be placed on a 30 day hold for a trial of IND management of symptoms with HEP.  The patient will be reassessed in 30 days for formal discharge if appropriate, or continue PT 1-2x/week for 8 visits if continued skilled PT services continue therapy for progress to goals stated above. Continue therapy with changes to Plan of Care to include: Freddy N Destiney    If you have any questions/comments please contact us directly. Thank you for allowing us to assist in the care of your patient.     Arpita Peterson, PT       9/29/2023       9:37 AM